# Patient Record
Sex: MALE | Employment: FULL TIME | URBAN - METROPOLITAN AREA
[De-identification: names, ages, dates, MRNs, and addresses within clinical notes are randomized per-mention and may not be internally consistent; named-entity substitution may affect disease eponyms.]

---
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
## 2020-02-03 ENCOUNTER — DOCUMENTATION ONLY (OUTPATIENT)
Dept: CARE COORDINATION | Facility: CLINIC | Age: 20
End: 2020-02-03

## 2020-02-07 ENCOUNTER — ALLIED HEALTH/NURSE VISIT (OUTPATIENT)
Dept: SURGERY | Facility: CLINIC | Age: 20
End: 2020-02-07
Payer: COMMERCIAL

## 2020-02-07 ENCOUNTER — OFFICE VISIT (OUTPATIENT)
Dept: ENDOCRINOLOGY | Facility: CLINIC | Age: 20
End: 2020-02-07
Payer: COMMERCIAL

## 2020-02-07 VITALS
DIASTOLIC BLOOD PRESSURE: 79 MMHG | TEMPERATURE: 98.4 F | HEART RATE: 80 BPM | BODY MASS INDEX: 41.75 KG/M2 | OXYGEN SATURATION: 96 % | HEIGHT: 73 IN | SYSTOLIC BLOOD PRESSURE: 133 MMHG | WEIGHT: 315 LBS

## 2020-02-07 DIAGNOSIS — F90.9 ATTENTION DEFICIT HYPERACTIVITY DISORDER (ADHD), UNSPECIFIED ADHD TYPE: Primary | ICD-10-CM

## 2020-02-07 DIAGNOSIS — E66.01 OBESITY, CLASS III, BMI 40-49.9 (MORBID OBESITY) (H): ICD-10-CM

## 2020-02-07 DIAGNOSIS — R03.0 ELEVATED BLOOD PRESSURE READING WITHOUT DIAGNOSIS OF HYPERTENSION: ICD-10-CM

## 2020-02-07 RX ORDER — DEXTROAMPHETAMINE SACCHARATE, AMPHETAMINE ASPARTATE, DEXTROAMPHETAMINE SULFATE AND AMPHETAMINE SULFATE 2.5; 2.5; 2.5; 2.5 MG/1; MG/1; MG/1; MG/1
10 TABLET ORAL DAILY
COMMUNITY
End: 2020-10-30 | Stop reason: ALTCHOICE

## 2020-02-07 RX ORDER — LISDEXAMFETAMINE DIMESYLATE 50 MG/1
50 CAPSULE ORAL EVERY MORNING
Qty: 14 CAPSULE | Refills: 0 | Status: SHIPPED | OUTPATIENT
Start: 2020-02-21 | End: 2020-04-15 | Stop reason: DRUGHIGH

## 2020-02-07 RX ORDER — DEXTROAMPHETAMINE SACCHARATE, AMPHETAMINE ASPARTATE, DEXTROAMPHETAMINE SULFATE AND AMPHETAMINE SULFATE 5; 5; 5; 5 MG/1; MG/1; MG/1; MG/1
20 TABLET ORAL DAILY
COMMUNITY
End: 2020-10-30 | Stop reason: ALTCHOICE

## 2020-02-07 RX ORDER — LISDEXAMFETAMINE DIMESYLATE 40 MG/1
40 CAPSULE ORAL EVERY MORNING
Qty: 14 CAPSULE | Refills: 0 | Status: SHIPPED | OUTPATIENT
Start: 2020-02-07 | End: 2020-04-15

## 2020-02-07 ASSESSMENT — MIFFLIN-ST. JEOR: SCORE: 2521.73

## 2020-02-07 ASSESSMENT — PAIN SCALES - GENERAL: PAINLEVEL: NO PAIN (0)

## 2020-02-07 NOTE — PROGRESS NOTES
"New Weight Management Nutrition Consultation    [place] White is a 19 year old male presents today for new weight management nutrition consultation.  Patient referred by Dr. Hernandez on February 7, 2020.    Patient with Co-morbidities of obesity including:  none    Anthropometrics:  Estimated body mass index is 41.89 kg/m  as calculated from the following:    Height as of this encounter: 1.86 m (6' 1.23\").    Weight as of this encounter: 144.9 kg (319 lb 8 oz).    Medications for Weight Loss:  None    NUTRITION HISTORY  See MD note for details.    - NKFA  - Trying to decrease dairy product intake  - No supplement (vitamin/minera) intake    Recent food recall:  Breakfast: Diet coke and 2 donut or 12 pc sushi - stopping by grocery store or caffeteria before school  Lunch: 1 [place] (roast beef + pretzel bun, cheese L/T); 1 egg sandwich (2 pc whole grain bread + 2 fried egg)  Dinner: Dinner at home - baked chicken nuggets (12 pc); 1 Gyro with lamb L/O/T on marco  Snacks: If feeling hungry after lunch occ has banana, yogurt of jerky. Tries to only eat when hungry.   Beverages: Diet coke (trying to limit to 1-2 per day), sparkling water. Avoids sugary beverages    Alcohol: None   Dining out: 1-2 times per day  - School cafeteria - foods offered are higher in CHO/fat fods  - Tries to do grocery store more often for healthier options  - Typically eating 2 meals per day (often skipping breakfast). Pt states he does not have hunger cues d/t side effects of Adderall     Physical Activity:  Likes doing projects that are more mentally stimulating. Has a fit bit, avg daily steps = 5000 steps. Open to increasing steps with intentional walks.      MALNUTRITION  % Intake: No decreased intake noted  % Weight Loss: None noted  Subcutaneous Fat Loss: None observed  Muscle Loss: None observed  Fluid Accumulation/Edema: None noted  Malnutrition Diagnosis: Patient does not meet two of the established criteria necessary for diagnosing " "malnutrition    Nutrition Prescription  Recommended energy/nutrient modification.  Volumetrics    Nutrition Diagnosis  Obesity r/t long history of self-monitoring deficit and excessive energy intake aeb BMI >30.    Nutrition Intervention  Materials/education provided on Volumetric eating to help satiety level on fewer calories; portion control and healthy food choices (Plate Method and Volumetrics handouts), 100 calorie snack choices, meal and snack planning and websites, and mindful eating. Discussed importance of meal consistency. Pt states he often does not feel hungry as a side effect of his medication, but will feel irritable or nauseas if he goes long periods without eating. Encouraged pt to set reminder on phone, or schedule meal times. Discussed focusing on adding a serving of fruit or vegetable to each meal, and increasing physical activity by taking 20 min walk after classes. Provided pt with list of goals and RD contact info.      Patient Understanding: good  Expected Compliance: good  Follow-Up Plans: meal planning using Volumetrics     Nutrition Goals  1) Eat 3 meals per day. Use the 9\" Plate method (1/2 plate non-starchy vegetables/fruit, 1/4 plate lean protein, 1/4 plate whole grain starch - no more than 1/2 cup carb/meal)   - Eat slowly (20-30 minutes per meal), chewing foods well (25 chews per bite/applesauce consistency). Stop when you feel full.    - Plan a fruit or vegetable to have with each meal. Tips - keep fresh fruit out on counter, frozen/microwavable vegetables on hand, look up new recipes for ideas.    - Avoid snacking between meals. If hungry, snack on non-starchy vegetables (carrots, broccoli), or small pc of fruit.  2) Start taking a daily multivitamin   3) Consume 64 oz of non-calorie containing fluids daily  4) Limit diet soda to <2 cans per day  5) Take a 20 min walk after classes to help increase daily steps. Aim for 7000 steps per day.     Healthy Recipe Resources  \"The " "Volumetrics Eating Plan\" by Wilma Ag, Ph.D.  www.[employer].com  www.Intellitacticsgirl.com   www.oldApexPeak.org  Dash Diet Recipes [facility]  www.extension.[facility] - the recipe box  \"Cooking that Counts\" by editors of [employer]    Follow-Up:  Monthly    Time spent with patient: 30 minutes.  Sheridan Simon RD, LD        "

## 2020-02-07 NOTE — PROGRESS NOTES
"New Weight Management Nutrition Consultation    [place] White is a 19 year old male presents today for new weight management nutrition consultation.  Patient referred by *** on February 7, 2020.    Patient with Co-morbidities of obesity including:  Type II DM {YESNO:130823}  Renal Failure {YESNO:194389}  Sleep apnea {YESNO:995004}  Hypertension {YESNO:107972}   Dyslipidemia {YESNO:726634}  Joint pain {YESNO:011138}  Back pain {YESNO:570404}  GERD {YESNO:600205}     Anthropometrics:  Estimated body mass index is 41.89 kg/m  as calculated from the following:    Height as of this encounter: 1.86 m (6' 1.23\").    Weight as of this encounter: 144.9 kg (319 lb 8 oz).    Medications for Weight Loss:  *** Phentermine, Topamax, Qsymia, Contrave, Naltrexone, Victoza, Saxenda, Trulicity, Metformin, Wellbutrin    NUTRITION HISTORY  See MD note for details.    - NKFA  - Trying to decrease dairy product intake  - No supplements     Recent food recall:  Breakfast: Diet coke and 2 donut or 12 pc sushi - stopping by grocery store or caffeteria before school  Lunch: 1 [place] (roast beef + pretzel bun, cheese L/T); 1 egg sandwich (2 pc whole grain bread + 2 fried egg)  Dinner: Dinner at home - baked chicken nuggets (12 pc); 1 Gyro with lamb L/O/T on marco  Snacks: If feeling hungry after lunch occ has banana, yogurt of jerky. Tries to only eat when hungry.   Beverages: Diet coke (trying to limit to 1-2 per day), sparkling water. Avoids sugary beverages    Alcohol: None   Dining out: 1-2 times per day  - School cafe - high CHO/fat fods  - Tries to do grocery store more often healthier options)  - Typically eating 2 meals per day (often skipping breakfast). Does not have hunger cues      Physical Activity:  Likes doing projects that are more mentally stimulating. Has a fit bit, avg daily steps = 5000 steps. Open to increasing steps with intentional walks.      MALNUTRITION  % Intake: { :448241}  % Weight Loss: { :628450}  Subcutaneous " "Fat Loss: { :670265}  Muscle Loss: { :478529}  Fluid Accumulation/Edema: { :944281}  Malnutrition Diagnosis: { :803267}    Nutrition Prescription  Recommended energy/nutrient modification.  *** calories/day (per MD)    Nutrition Diagnosis  Food and nutrition related knowledge deficit r/t lack of prior exposure to calorie counting and nutrition education aeb pt unable to verbalize understanding of *** calorie/day diet for weight loss.  ***  Obesity r/t long history of self-monitoring deficit and excessive energy intake aeb BMI >30.    Nutrition Intervention  Materials/education provided on *** calorie/day diet with portion control and healthy food choices (What to Eat handout), 100 calorie snack choices, meal and snack planning and websites, sample meal plans  ***  Materials/education provided on Volumetric eating to help satiety level on fewer calories; portion control and healthy food choices (Plate Method and Volumetrics handouts), 100 calorie snack choices, meal and snack planning and websites, sample meal plans     Patient Understanding: { :742589}  Expected Compliance: { :067327}  Follow-Up Plans: ***     Nutrition Goals  1) Eat 3 meals per day. Use the 9\" Plate method (1/2 plate non-starchy vegetables/fruit, 1/4 plate lean protein, 1/4 plate whole grain starch - no more than 1/2 cup carb/meal)   - Eat slowly (20-30 minutes per meal), chewing foods well (25 chews per bite/applesauce consistency). Stop when you feel full.    - Plan a fruit or vegetable to have with each meal. Tips - keep fresh fruit out on counter, frozen/microwavable vegetables on hand, look up new recipes for ideas.    - Avoid snacking between meals. If hungry, snack on non-starchy vegetables (carrots, broccoli), or small pc of fruit.  2) Start taking a daily multivitamin   3) Consume 64 oz of non-calorie containing fluids daily  4) Limit diet soda to <2 cans per day  5) Take a 20 min walk after classes to help increase daily steps. Aim for " "7000 steps per day.     Healthy Recipe Resources  \"The Volumetrics Eating Plan\" by Wilma Ag, Ph.D.  www.[facility].com  www.Storytime Studiosgirl.aitainment   www.[employer] Me Listed.org  Dash Diet Recipes [facility]  www.extension.[facility] - the recipe box  \"Cooking that Counts\" by editors of [employer]        {UC SURGERY GOALS RELATING BEVERAGE:539096631}  {UC SURGERY GOALS RELATING ACTVITY:632529037}    Follow-Up:  PRN    Time spent with patient: *** minutes.  Sheridan Simon RD, LD        "

## 2020-02-07 NOTE — PATIENT INSTRUCTIONS
"Nutrition Goals  1) Eat 3 meals per day. Use the 9\" Plate method (1/2 plate non-starchy vegetables/fruit, 1/4 plate lean protein, 1/4 plate whole grain starch - no more than 1/2 cup carb/meal)              - Eat slowly (20-30 minutes per meal), chewing foods well (25 chews per bite/applesauce consistency). Stop when you feel full.               - Plan a fruit or vegetable to have with each meal. Tips - keep fresh fruit out on counter, frozen/microwavable vegetables on hand, look up new recipes for ideas.               - Avoid snacking between meals. If hungry, snack on non-starchy vegetables (carrots, broccoli), or small pc of fruit.  2) Start taking a daily multivitamin   3) Consume 64 oz of non-calorie containing fluids daily  4) Limit diet soda to <2 cans per day  5) Take a 20 min walk after classes to help increase daily steps. Aim for 7000 steps per day.      Healthy Recipe Resources  \"The Volumetrics Eating Plan\" by Wilma Ag, Ph.D.  www.[employer]Kit.com  www.hungryorderTalkgirl.4meee   www.oldNimbus LLCpt.org  Dash Diet Recipes [facility]  www.extension.[facility] - the recipe box  \"Cooking that Counts\" by editors of [employer]    Follow-up with RD in one 1 month    Sheridan Simon RD, LD  If you would like to schedule or reschedule an appointment with the RD, please call [phone]    Interested in working with a health ?  Health coaches work with you to improve your overall health and wellbeing.  They look at the whole person, and may involve discussion of different areas of life, including, but not limited to the four pillars of health (sleep, exercise, nutrition, and stress management). Discuss with your care team if you would like to start working a health .    Health Coaching-3 Pack:    $99 for three health coaching visits    Visits may be done in person or via phone    Coaching is a partnership between the  and the client; Coaches do not prescribe or diagnose    Coaching helps inspire the client to " reach his/her personal goals    Healthy Lifestyle Support Group  February: Self Compassion for Weight Loss  Research shows that self-compassion influences our ability to lose weight and maintain our health goals. Learn about the three elements of self-compassion as we explore a variety of self-compassion practices and resources so that you leave feeling empowered and connected to yourself and your health journey.    Date: Wednesday, February 19th, 4:30pm to 5:30pm  Location: [facility]  [address] [place] [address] 1, Rm 1.4    No registration required. For more information, contact your health .  [email or web address]

## 2020-02-07 NOTE — NURSING NOTE
"(   Chief Complaint   Patient presents with     Consult     New weight management.    )    ( Weight: 144.9 kg (319 lb 8 oz) )  ( Height: 186 cm (6' 1.23\") )  ( BMI (Calculated): 41.89 )  (   )  (   )  (   )  (   )  (   )  (   )    ( BP: 133/79 )  (   )  ( Temp: 98.4  F (36.9  C) )  ( Temp src: Oral )  ( Pulse: 80 )  (   )  ( SpO2: 96 % )    ( There is no problem list on file for this patient.   )  (   Current Outpatient Medications   Medication Sig Dispense Refill     amphetamine-dextroamphetamine (ADDERALL) 10 MG tablet Take 10 mg by mouth daily        amphetamine-dextroamphetamine (ADDERALL) 20 MG tablet Take 20 mg by mouth daily      )  ( Diabetes Eval:    )    ( Pain Eval:  No Pain (0) )    ( Wound Eval:       )    (   History   Smoking Status     Never Smoker   Smokeless Tobacco     Never Used    )    ( Signed By:  Naya Dobson, EMT; February 7, 2020; 11:01 AM )    "

## 2020-02-07 NOTE — PATIENT INSTRUCTIONS
"Sleep:  -- consider a blue light filter   -- cover all blue lights if possible   -- consider ear plugs, white noise     Vyvanse: stop adderrall when you start it  - most common side effects: dry mouth, the dry mouth goes away after 2 months (sugar free gum, water)  - OK to take on weekends, but if you take it late in the mornign it may make it hard to fall asleep that night.    Welcome to our weight management program!   We are excited to join you on your weight loss journey    Thank you for allowing us the privilege of caring for you. We hope we provided you with the excellent service you deserve.   Please let us know if there is anything else we can do for you so that we can be sure you are leaving completely satisfied with your care experience.    To ensure the quality of our services you may be receiving a patient satisfaction survey from an independent patient satisfaction monitoring company.    The greatest compliment you can give is a \"Likely to Recommend\"    You saw Dr. Hernandez today.    Instructions per today's visit:   Medications started today:   MT[facility] pharmacist referral:   Sleep referral:   Exercise referral (FOZIA) Get Moving Program:   Health Psychology:   Other referrals ordered today:     Follow up appointments: 1 month         Interested in working with a health ?  Health coaches work with you to improve your overall health and wellbeing.  They look at the whole person, and may involve discussion of different areas of life, including, but not limited to the four pillars of health (sleep, exercise, nutrition, and stress management). Discuss with your care team if you would like to start working a health .  Health Coaching-3 Pack:    $99 for three health coaching visits    Visits may be done in person or via phone    Coaching is a partnership between the  and the client; Coaches do not prescribe or diagnose    Coaching helps inspire the client to reach his/her personal goals     For any " questions/concerns contact Leyda Cross LPN at [phone]     To schedule appointments with our team, please call [phone]     Please call during clinic hours Monday through Friday 8:00a - 4:00p if you have questions or you can contact us via [facility] at anytime. ?    Lab results will be communicated through My Chart or letter (if My Chart not used). Please call the clinic if you have not received communication after 1 week or if you have any questions.?      Fax: [phone]    Thank you,  Medical Weight Management Team

## 2020-02-07 NOTE — LETTER
2020     RE: Ronny Hancock  [address] [facility] [address]     Dear Colleague,    Thank you for referring your patient, Ronny Hancock, to the [facility] at [facility]. Please see a copy of my visit note below.    New Medical Weight Management Consult    PATIENT:  Ronny Hancock  MRN:         [id number]  :         2000  AMBER:         2020    Dear Juanjose Norris,    I had the pleasure of seeing your patient, Ronny Hancock.  Full intake/assessment done to determine barriers to weight loss success and develop a treatment plan.  Ronny Hancock is a 19 year old male interested in treatment of medical problems associated with weight.  His weight today is 319 lbs 8 oz, Body mass index is 41.89 kg/m ., and he has the following co-morbidities:     2020   I have the following health issues associated with obesity: None of the above   I have the following symptoms associated with obesity: None of the above     Goals for coming here: trying to lose weight in general, no specific goal.   Weight history: gained weight in high school, recently stopped gaining. But is not losing.   Past experiences with weight loss: not seriously, walked every day in the summer.   Typical day schedule: gets up at 9:30, at school until 1:50pm. Lunch at school around noon. Home around 2pm. Then video games/TV. Then adderrall wears off (20mg at 9:30pm, ends at 5pm). No nap. Dinner around 5pm-6pm.     Sleep: Goes to bed at inconsistent times, tries to be in bed by midnight, falls sleep at 12:30-1pm, TV in the bedroom? Laptop in bed, watches movie to fall asleep. Does wake up during night: sometimes, because of cat. Can fall asleep right away.  Snores: snore  Typical food: Breakfast: before adderall because he's not hungry after (gets sushi, diet coke, donut). Not eating food at home in morning (because sister and mom are dieting). Snack: no, Lunch: 11:00am,  "Snack: nothing until 5pm, maybe beef jerky; Dinner after 6pm: oven chicken nuggets, eggs, hot dogs, eating after dinner: yes sometimes. Does sometimes eat during the night as well  Periods of hunger during the day: when adderral wears off at around 5pm  Typical snacks: did not ask   Typical beverages: sparkling water, weaning off diet coke, no coffee/tea  Who lives at home: mom, dad, sister  Are they supportive: yes  Who does the shopping: mom/dad, and the cooking: mom/dad.   Mood: good, denied   Mental Health History Reviewed With Patient 2/7/2020   Have you ever been physically or sexually abused? No   If yes, do you feel that the abuse is affecting your weight? N/A   If yes, would you like to talk to a counselor about the abuse? N/A   How often in the past 2 weeks have you felt little interest or pleasure in doing things? More Than Half the Days   Over the past 2 weeks how often have you felt down, depressed, or hopeless? Not at all     Exercise: walking -- tracks steps, often around 5,000 steps     Patient Goals 2/7/2020   I am interested in having a healthier weight to diminish current health problems: Yes   I am interested in having a healthier weight in order to prevent future health problems: Yes   I am interested in having a healthier weight in order to have a future surgery: No     Referring Provider 2/7/2020   Please name the provider who referred you to Medical Weight Management.  If you do not know, please answer: \"I Don't Know\". my mother     Wt Readings from Last 4 Encounters:   02/07/20 144.9 kg (319 lb 8 oz) (>99 %)*     * Growth percentiles are based on CDC (Boys, 2-20 Years) data.     Weight History 2/7/2020   How concerned are you about your weight? Somewhat Concerned   Would you describe your weight gain as gradual? Yes   I became overweight: As a Teenager   The following factors have contributed to my weight gain:  Eating Wrong Types of Food, Eating Too Much, Lack of Exercise, Genetic (Runs in " the Family)   I have tried the following methods to lose weight: Exercise   My lowest weight since age 18 was: 300   My highest weight since age 18 was: 320   The most weight I have ever lost was: (lbs) 0   I have the following family history of obesity/being overweight:  Many of my relatives are overweight   Has anyone in your family had weight loss surgery? Yes   How has your weight changed over the last year?  Gained   How many pounds? 3       Diet Recall 2/7/2020   Glass juice/day 0   Glass milk/day 0   Glass sugary drink/day 0   How many cans/bottles of sugar pop/soda/tea/sports drinks do you drink in a day? 3   Diet drink/day 3   Alcohol Never       Eating Habits 2/7/2020   Generally, my meals include foods like these: bread, pasta, rice, potatoes, corn, crackers, sweet dessert, pop, or juice. Almost Everyday   Generally, my meals include foods like these: fried meats, brats, burgers, french fries, pizza, cheese, chips, or ice cream. Almost Everyday   Eat fast food (like [employer], [employer], [employer]aco [employer]). A Few Times a Week   Eat at a buffet or sit-down restaurant. Once a Week   Eat most of my meals in front of the TV or computer. Everyday   Often skip meals, eat at random times, have no regular eating times. Everyday   Rarely sit down for a meal but snack or graze throughout.  A Few Times a Week   Eat extra snacks between meals. A Few Times a Week   Eat most of my food at the end of the day. Almost Everyday   Eat in the middle of the night or wake up at night to eat. Almost Everyday   Eat extra snacks to prevent or correct low blood sugar. A Few Times a Week   Eat to prevent acid reflux or stomach pain. Never   Worry about not having enough food to eat. Never   Have you been to the food shelf at least a few times this year? No   I eat when I am depressed. Never   I eat when I am stressed. Never   I eat when I am bored. Once a Week   I eat when I am anxious. Never   I eat when I am happy or as a reward. Never    I feel hungry all the time even if I just have eaten. A Few Times a Week   Feeling full is important to me. Never   I finish all the food on my plate even if I am already full. Never   I can't resist eating delicious food or walk past the good food/smell. Almost Everyday   I eat/snack without noticing that I am eating. Never   I eat when I am preparing the meal. A Few Times a Week   I eat more than usual when I see others eating. Never   I have trouble not eating sweets, ice cream, cookies, or chips if they are around the house. A Few Times a Week   I think about food all day. Never   What foods, if any, do you crave? None     Activity/Exercise History 2/7/2020   How much of a typical 12 hour day do you spend sitting? Most of the Day   How much of a typical 12 hour day do you spend lying down? Less Than Half the Day   How much of a typical day do you spend walking/standing? Less Than Half the Day   How many hours (not including work) do you spend on the TV/Video Games/Computer/Tablet/Phone? 6 Hours or More   How many times a week are you active for the purpose of exercise? Once a Week   What keeps you from being more active? Lack of Time       PAST MEDICAL HISTORY:  No flowsheet data found.  No past medical history on file.    Work/Social History Reviewed With Patient 2/7/2020   My employment status is: Student   How many hours do you spend commuting to work daily?  1   What is your marital status? Single   If in a relationship, is your significant other overweight? N/A   Do you have children? No   If you have children, are they overweight? N/A   Who do you live with?  family   Are they supportive of your health goals? Yes   Who does the food shopping?  parents     No flowsheet data found.   SUrgical History:   Appendicitis, tonsils removed   Family History: both parents have BLANCA; sister, father, mother have obesity     MEDICATIONS:   Current Outpatient Medications   Medication Sig Dispense Refill      "amphetamine-dextroamphetamine (ADDERALL) 10 MG tablet Take 10 mg by mouth daily        amphetamine-dextroamphetamine (ADDERALL) 20 MG tablet Take 20 mg by mouth daily       lisdexamfetamine (VYVANSE) 40 MG capsule Take 1 capsule (40 mg) by mouth every morning Take it first thing when you wake up, don't take with food. 14 capsule 0     lisdexamfetamine (VYVANSE) 50 MG capsule Take 1 capsule (50 mg) by mouth every morning Take right when you wake up in the morning, without food. 14 capsule 0       ALLERGIES:   No Known Allergies    PHYSICAL EXAM:  /79 (BP Location: Left arm, Patient Position: Sitting, Cuff Size: Adult Large)   Pulse 80   Temp 98.4  F (36.9  C) (Oral)   Ht 1.86 m (6' 1.23\")   Wt 144.9 kg (319 lb 8 oz)   SpO2 96%   BMI 41.89 kg/m      A & O x 3  HEENT: NCAT, mucous membranes moist  Respirations unlabored  Location of obesity: Mixed Obesity   Modified Mallampati: 3    LABS: he wants to get labs at his next visit   No results found for: A1C  No results found for: CHOL  No results found for: TSH  No results found for: CR  No results found for: ALT    ASSESSMENT:  Mr. Hancock is a 19 year old male with history of The primary encounter diagnosis was Attention deficit hyperactivity disorder (ADHD), unspecified ADHD type. Diagnoses of Elevated blood pressure reading without diagnosis of hypertension and Obesity, Class III, BMI 40-49.9 (morbid obesity) (H) were also pertinent to this visit. and Class III obesity with current body mass index is 41.89 kg/m . and with current health consequences who presents for an initial weight management consultation. Ronny Hancock overall has a fairly sedentary lifestyle with a lot of screen time.    PLAN:    Attention deficit hyperactivity disorder (ADHD), unspecified ADHD type  He notes significant change in appetite after adderrall wears off, so we will switch to a long-acting stimulant medication for weight loss. Lisdexamfetamine has benefits for binge-eating as " "well.   - lisdexamfetamine (VYVANSE) 40 MG capsule  Dispense: 14 capsule; Refill: 0  - lisdexamfetamine (VYVANSE) 50 MG capsule  Dispense: 14 capsule; Refill: 0    Elevated blood pressure reading without diagnosis of hypertension  Will continue to monitor, RTC in 4 weeks for follow-up    Obesity, Class III, BMI 40-49.9 (morbid obesity) (H)  Will start with a goal of losing 10% of body weight. Will start with change to lisdexamfetamine, will continue other pharmacotherapy in the future. Will get labs if willing, at next visit.     With motivational interviewing, [place] took part in making the following plan for their Class III Obesity:   Patient Instructions   Sleep:  -- consider a blue light filter   -- cover all blue lights if possible   -- consider ear plugs, white noise     Vyvanse: stop adderrall when you start it  - most common side effects: dry mouth, the dry mouth goes away after 2 months (sugar free gum, water)  - OK to take on weekends, but if you take it late in the mornign it may make it hard to fall asleep that night.    Welcome to our weight management program!   We are excited to join you on your weight loss journey    Thank you for allowing us the privilege of caring for you. We hope we provided you with the excellent service you deserve.   Please let us know if there is anything else we can do for you so that we can be sure you are leaving completely satisfied with your care experience.    To ensure the quality of our services you may be receiving a patient satisfaction survey from an independent patient satisfaction monitoring company.    The greatest compliment you can give is a \"Likely to Recommend\"    You saw Dr. Hernandez today.    Instructions per today's visit:   Medications started today:   MTM pharmacist referral:   Sleep referral:   Exercise referral (FOZIA) Get Moving Program:   Health Psychology:   Other referrals ordered today:     Follow up appointments: 1 month         Interested in " working with a health ?  Health coaches work with you to improve your overall health and wellbeing.  They look at the whole person, and may involve discussion of different areas of life, including, but not limited to the four pillars of health (sleep, exercise, nutrition, and stress management). Discuss with your care team if you would like to start working a health .  Health Coaching-3 Pack:    $99 for three health coaching visits    Visits may be done in person or via phone    Coaching is a partnership between the  and the client; Coaches do not prescribe or diagnose    Coaching helps inspire the client to reach his/her personal goals     For any questions/concerns contact Leyda Cross LPN at [phone]     To schedule appointments with our team, please call [phone]     Please call during clinic hours Monday through Friday 8:00a - 4:00p if you have questions or you can contact us via [employer] at anytime. ?    Lab results will be communicated through My Chart or letter (if My Chart not used). Please call the clinic if you have not received communication after 1 week or if you have any questions.?      Fax: [phone]    Thank you,  Medical Weight Management Team          RTC:    4 weeks.    TIME: 40 min spent on evaluation, management, counseling, education, & motivational interviewing with greater than 50 % of the total time was spent on counseling and coordinating care    Sincerely,    Jacqueline Hernandez MD  Diplomat, [employer] Board of Obesity Medicine

## 2020-02-07 NOTE — PROGRESS NOTES
New Medical Weight Management Consult    PATIENT:  Ronny Hancock  MRN:         [id number]  :         2000  AMBER:         2020    Dear Juanjose Norris,    I had the pleasure of seeing your patient, Ronny Hancock.  Full intake/assessment done to determine barriers to weight loss success and develop a treatment plan.  Ronny Hancock is a 19 year old male interested in treatment of medical problems associated with weight.  His weight today is 319 lbs 8 oz, Body mass index is 41.89 kg/m ., and he has the following co-morbidities:     2020   I have the following health issues associated with obesity: None of the above   I have the following symptoms associated with obesity: None of the above     Goals for coming here: trying to lose weight in general, no specific goal.   Weight history: gained weight in high school, recently stopped gaining. But is not losing.   Past experiences with weight loss: not seriously, walked every day in the summer.   Typical day schedule: gets up at 9:30, at school until 1:50pm. Lunch at school around noon. Home around 2pm. Then video games/TV. Then adderrall wears off (20mg at 9:30pm, ends at 5pm). No nap. Dinner around 5pm-6pm.     Sleep: Goes to bed at inconsistent times, tries to be in bed by midnight, falls sleep at 12:30-1pm, TV in the bedroom? Laptop in bed, watches movie to fall asleep. Does wake up during night: sometimes, because of cat. Can fall asleep right away.  Snores: snore  Typical food: Breakfast: before adderall because he's not hungry after (gets sushi, diet coke, donut). Not eating food at home in morning (because sister and mom are dieting). Snack: no, Lunch: 11:00am, Snack: nothing until 5pm, maybe beef jerky; Dinner after 6pm: oven chicken nuggets, eggs, hot dogs, eating after dinner: yes sometimes. Does sometimes eat during the night as well  Periods of hunger during the day: when adderral wears off at around 5pm  Typical snacks: did not ask  "  Typical beverages: sparkling water, weaning off diet coke, no coffee/tea  Who lives at home: mom, dad, sister  Are they supportive: yes  Who does the shopping: mom/dad, and the cooking: mom/dad.   Mood: good, denied   Mental Health History Reviewed With Patient 2/7/2020   Have you ever been physically or sexually abused? No   If yes, do you feel that the abuse is affecting your weight? N/A   If yes, would you like to talk to a counselor about the abuse? N/A   How often in the past 2 weeks have you felt little interest or pleasure in doing things? More Than Half the Days   Over the past 2 weeks how often have you felt down, depressed, or hopeless? Not at all     Exercise: walking -- tracks steps, often around 5,000 steps     Patient Goals 2/7/2020   I am interested in having a healthier weight to diminish current health problems: Yes   I am interested in having a healthier weight in order to prevent future health problems: Yes   I am interested in having a healthier weight in order to have a future surgery: No     Referring Provider 2/7/2020   Please name the provider who referred you to Medical Weight Management.  If you do not know, please answer: \"I Don't Know\". my mother     Wt Readings from Last 4 Encounters:   02/07/20 144.9 kg (319 lb 8 oz) (>99 %)*     * Growth percentiles are based on CDC (Boys, 2-20 Years) data.     Weight History 2/7/2020   How concerned are you about your weight? Somewhat Concerned   Would you describe your weight gain as gradual? Yes   I became overweight: As a Teenager   The following factors have contributed to my weight gain:  Eating Wrong Types of Food, Eating Too Much, Lack of Exercise, Genetic (Runs in the Family)   I have tried the following methods to lose weight: Exercise   My lowest weight since age 18 was: 300   My highest weight since age 18 was: 320   The most weight I have ever lost was: (lbs) 0   I have the following family history of obesity/being overweight:  Many of " my relatives are overweight   Has anyone in your family had weight loss surgery? Yes   How has your weight changed over the last year?  Gained   How many pounds? 3       Diet Recall 2/7/2020   Glass juice/day 0   Glass milk/day 0   Glass sugary drink/day 0   How many cans/bottles of sugar pop/soda/tea/sports drinks do you drink in a day? 3   Diet drink/day 3   Alcohol Never       Eating Habits 2/7/2020   Generally, my meals include foods like these: bread, pasta, rice, potatoes, corn, crackers, sweet dessert, pop, or juice. Almost Everyday   Generally, my meals include foods like these: fried meats, brats, burgers, french fries, pizza, cheese, chips, or ice cream. Almost Everyday   Eat fast food (like McDonalds, Koala[employer], Taco Bell). A Few Times a Week   Eat at a buffet or sit-down restaurant. Once a Week   Eat most of my meals in front of the TV or computer. Everyday   Often skip meals, eat at random times, have no regular eating times. Everyday   Rarely sit down for a meal but snack or graze throughout.  A Few Times a Week   Eat extra snacks between meals. A Few Times a Week   Eat most of my food at the end of the day. Almost Everyday   Eat in the middle of the night or wake up at night to eat. Almost Everyday   Eat extra snacks to prevent or correct low blood sugar. A Few Times a Week   Eat to prevent acid reflux or stomach pain. Never   Worry about not having enough food to eat. Never   Have you been to the food shelf at least a few times this year? No   I eat when I am depressed. Never   I eat when I am stressed. Never   I eat when I am bored. Once a Week   I eat when I am anxious. Never   I eat when I am happy or as a reward. Never   I feel hungry all the time even if I just have eaten. A Few Times a Week   Feeling full is important to me. Never   I finish all the food on my plate even if I am already full. Never   I can't resist eating delicious food or walk past the good food/smell. Almost Everyday   I  eat/snack without noticing that I am eating. Never   I eat when I am preparing the meal. A Few Times a Week   I eat more than usual when I see others eating. Never   I have trouble not eating sweets, ice cream, cookies, or chips if they are around the house. A Few Times a Week   I think about food all day. Never   What foods, if any, do you crave? None     Activity/Exercise History 2/7/2020   How much of a typical 12 hour day do you spend sitting? Most of the Day   How much of a typical 12 hour day do you spend lying down? Less Than Half the Day   How much of a typical day do you spend walking/standing? Less Than Half the Day   How many hours (not including work) do you spend on the TV/Video Games/Computer/Tablet/Phone? 6 Hours or More   How many times a week are you active for the purpose of exercise? Once a Week   What keeps you from being more active? Lack of Time       PAST MEDICAL HISTORY:  No flowsheet data found.  No past medical history on file.    Work/Social History Reviewed With Patient 2/7/2020   My employment status is: Student   How many hours do you spend commuting to work daily?  1   What is your marital status? Single   If in a relationship, is your significant other overweight? N/A   Do you have children? No   If you have children, are they overweight? N/A   Who do you live with?  family   Are they supportive of your health goals? Yes   Who does the food shopping?  parents     No flowsheet data found.   SUrgical History:   Appendicitis, tonsils removed   Family History: both parents have BLANCA; sister, father, mother have obesity     MEDICATIONS:   Current Outpatient Medications   Medication Sig Dispense Refill     amphetamine-dextroamphetamine (ADDERALL) 10 MG tablet Take 10 mg by mouth daily        amphetamine-dextroamphetamine (ADDERALL) 20 MG tablet Take 20 mg by mouth daily       lisdexamfetamine (VYVANSE) 40 MG capsule Take 1 capsule (40 mg) by mouth every morning Take it first thing when you  "wake up, don't take with food. 14 capsule 0     lisdexamfetamine (VYVANSE) 50 MG capsule Take 1 capsule (50 mg) by mouth every morning Take right when you wake up in the morning, without food. 14 capsule 0       ALLERGIES:   No Known Allergies    PHYSICAL EXAM:  /79 (BP Location: Left arm, Patient Position: Sitting, Cuff Size: Adult Large)   Pulse 80   Temp 98.4  F (36.9  C) (Oral)   Ht 1.86 m (6' 1.23\")   Wt 144.9 kg (319 lb 8 oz)   SpO2 96%   BMI 41.89 kg/m     A & O x 3  HEENT: NCAT, mucous membranes moist  Respirations unlabored  Location of obesity: Mixed Obesity   Modified Mallampati: 3    LABS: he wants to get labs at his next visit   No results found for: A1C  No results found for: CHOL  No results found for: TSH  No results found for: CR  No results found for: ALT    ASSESSMENT:  Mr. Hancock is a 19 year old male with history of The primary encounter diagnosis was Attention deficit hyperactivity disorder (ADHD), unspecified ADHD type. Diagnoses of Elevated blood pressure reading without diagnosis of hypertension and Obesity, Class III, BMI 40-49.9 (morbid obesity) (H) were also pertinent to this visit. and Class III obesity with current body mass index is 41.89 kg/m . and with current health consequences who presents for an initial weight management consultation. Ronny Hancock overall has a fairly sedentary lifestyle with a lot of screen time.    PLAN:    Attention deficit hyperactivity disorder (ADHD), unspecified ADHD type  He notes significant change in appetite after adderrall wears off, so we will switch to a long-acting stimulant medication for weight loss. Lisdexamfetamine has benefits for binge-eating as well.   - lisdexamfetamine (VYVANSE) 40 MG capsule  Dispense: 14 capsule; Refill: 0  - lisdexamfetamine (VYVANSE) 50 MG capsule  Dispense: 14 capsule; Refill: 0    Elevated blood pressure reading without diagnosis of hypertension  Will continue to monitor, RTC in 4 weeks for " "follow-up    Obesity, Class III, BMI 40-49.9 (morbid obesity) (H)  Will start with a goal of losing 10% of body weight. Will start with change to lisdexamfetamine, will continue other pharmacotherapy in the future. Will get labs if willing, at next visit.     With motivational interviewing, Ronny took part in making the following plan for their Class III Obesity:   Patient Instructions   Sleep:  -- consider a blue light filter   -- cover all blue lights if possible   -- consider ear plugs, white noise     Vyvanse: stop adderrall when you start it  - most common side effects: dry mouth, the dry mouth goes away after 2 months (sugar free gum, water)  - OK to take on weekends, but if you take it late in the mornign it may make it hard to fall asleep that night.    Welcome to our weight management program!   We are excited to join you on your weight loss journey    Thank you for allowing us the privilege of caring for you. We hope we provided you with the excellent service you deserve.   Please let us know if there is anything else we can do for you so that we can be sure you are leaving completely satisfied with your care experience.    To ensure the quality of our services you may be receiving a patient satisfaction survey from an independent patient satisfaction monitoring company.    The greatest compliment you can give is a \"Likely to Recommend\"    You saw Dr. Hernandez today.    Instructions per today's visit:   Medications started today:   MT[facility] pharmacist referral:   Sleep referral:   Exercise referral (FOZIA) Get Moving Program:   Health Psychology:   Other referrals ordered today:     Follow up appointments: 1 month         Interested in working with a health ?  Health coaches work with you to improve your overall health and wellbeing.  They look at the whole person, and may involve discussion of different areas of life, including, but not limited to the four pillars of health (sleep, exercise, nutrition, " and stress management). Discuss with your care team if you would like to start working a health .  Health Coaching-3 Pack:    $99 for three health coaching visits    Visits may be done in person or via phone    Coaching is a partnership between the  and the client; Coaches do not prescribe or diagnose    Coaching helps inspire the client to reach his/her personal goals     For any questions/concerns contact Leyda Cross LPN at [phone]     To schedule appointments with our team, please call [phone]     Please call during clinic hours Monday through Friday 8:00a - 4:00p if you have questions or you can contact us via [employer] at anytime. ?    Lab results will be communicated through My Chart or letter (if My Chart not used). Please call the clinic if you have not received communication after 1 week or if you have any questions.?      Fax: [phone]    Thank you,  Medical Weight Management Team          RTC:    4 weeks.    TIME: 40 min spent on evaluation, management, counseling, education, & motivational interviewing with greater than 50 % of the total time was spent on counseling and coordinating care    Sincerely,    Jacqueline Hernandez MD  Diplomat, American Board of Obesity Medicine

## 2020-03-11 ENCOUNTER — HEALTH MAINTENANCE LETTER (OUTPATIENT)
Age: 20
End: 2020-03-11

## 2020-04-15 DIAGNOSIS — F90.9 ATTENTION DEFICIT HYPERACTIVITY DISORDER (ADHD), UNSPECIFIED ADHD TYPE: ICD-10-CM

## 2020-04-15 RX ORDER — LISDEXAMFETAMINE DIMESYLATE 20 MG/1
20 CAPSULE ORAL EVERY MORNING
Qty: 30 CAPSULE | Refills: 0 | Status: SHIPPED | OUTPATIENT
Start: 2020-04-15 | End: 2020-10-30

## 2020-10-05 ENCOUNTER — MYC REFILL (OUTPATIENT)
Dept: PEDIATRICS | Facility: CLINIC | Age: 20
End: 2020-10-05

## 2020-10-05 DIAGNOSIS — F90.9 ATTENTION DEFICIT HYPERACTIVITY DISORDER (ADHD), UNSPECIFIED ADHD TYPE: ICD-10-CM

## 2020-10-05 RX ORDER — LISDEXAMFETAMINE DIMESYLATE 20 MG/1
20 CAPSULE ORAL EVERY MORNING
Qty: 30 CAPSULE | Refills: 0 | Status: CANCELLED | OUTPATIENT
Start: 2020-10-05

## 2020-10-05 NOTE — TELEPHONE ENCOUNTER
Refill denied. Patient needs appointment. [employer] message sent to patient to schedule appointment with Dr. Hernandez.

## 2020-10-30 ENCOUNTER — VIRTUAL VISIT (OUTPATIENT)
Dept: ENDOCRINOLOGY | Facility: CLINIC | Age: 20
End: 2020-10-30
Payer: COMMERCIAL

## 2020-10-30 VITALS — BODY MASS INDEX: 38.89 KG/M2 | HEIGHT: 76 IN

## 2020-10-30 DIAGNOSIS — F90.9 ATTENTION DEFICIT HYPERACTIVITY DISORDER (ADHD), UNSPECIFIED ADHD TYPE: ICD-10-CM

## 2020-10-30 DIAGNOSIS — E66.01 OBESITY, CLASS III, BMI 40-49.9 (MORBID OBESITY) (H): Primary | ICD-10-CM

## 2020-10-30 PROCEDURE — 99214 OFFICE O/P EST MOD 30 MIN: CPT | Mod: 95 | Performed by: INTERNAL MEDICINE

## 2020-10-30 RX ORDER — LISDEXAMFETAMINE DIMESYLATE 20 MG/1
20 CAPSULE ORAL EVERY MORNING
Qty: 30 CAPSULE | Refills: 0 | Status: SHIPPED | OUTPATIENT
Start: 2020-10-30 | End: 2020-12-12

## 2020-10-30 RX ORDER — METFORMIN HCL 500 MG
TABLET, EXTENDED RELEASE 24 HR ORAL
Qty: 120 TABLET | Refills: 1 | Status: SHIPPED | OUTPATIENT
Start: 2020-10-30 | End: 2021-01-29

## 2020-10-30 ASSESSMENT — PAIN SCALES - GENERAL: PAINLEVEL: NO PAIN (0)

## 2020-10-30 NOTE — PROGRESS NOTES
"Return Medical Weight Management Note  Ronny Hancock  MRN:  [id number]  :  2000  AMBER:  10/30/2020    Dear Juanjose Norris (Inactive),    I had the pleasure of seeing your patient Ronny Hancock for follow-up consultation.  He is a 19 year old male who I am continuing to see for treatment of obesity related to:       2020   I have the following health issues associated with obesity: None of the above   I have the following symptoms associated with obesity: None of the above     INTERVAL HISTORY:  [place] vyvanse helped with appetite and not overeating.   Is not sure if his weight is up or down, has not been weighed since his last visit.     CURRENT WEIGHT:   0 lbs 0 oz    Wt Readings from Last 4 Encounters:   20 144.9 kg (319 lb 8 oz) (>99 %, Z= 3.25)*     * Growth percentiles are based on [facility] (Boys, 2-20 Years) data.     Height:  6' 4\"  Body Mass Index:  Body mass index is 38.89 kg/m .  Vitals:  B/P: Data Unavailable, P: Data Unavailable, R: Data Unavailable     Starting weight in weight management with Dr. Hernandez was: 319    Diet Recall Review with Patient 2020   Do you typically eat breakfast? No   Do you typically eat lunch? Yes   Do you typically eat supper? Yes   Do you typically eat snacks? Yes   Do you like vegetables?  Yes   Do you drink water? Yes   How many glasses of juice do you drink in a typical day? 0   How many of glasses of milk do you drink in a typical day? 0   If you do drink milk, what type? N/A   How many 8oz glasses of sugar containing drinks such as Aguilar-Aid/sweet tea do you drink in a day? 0   How many cans/bottles of sugar pop/soda/tea/sports drinks do you drink in a day? 3   How many cans/bottles of diet pop/soda/tea or sports drink do you drink in a day? 3   How often do you have a drink of alcohol? Never       MEDICATIONS:   Current Outpatient Medications   Medication     lisdexamfetamine (VYVANSE) 20 MG capsule     metFORMIN (GLUCOPHAGE-XR) 500 MG 24 hr tablet " "    No current facility-administered medications for this visit.        Weight Loss Medication History Reviewed With Patient 10/29/2020   Which weight loss medications are you currently taking on a regular basis?  None   If you are not taking a weight loss medication that was prescribed to you, please indicate why: Other   Are you having any side effects from the weight loss medication that we have prescribed you? No       LABS:  No results found for: A1C  No results found for: CHOL  No results found for: TSH  No results found for: CR  No results found for: ALT    ASSESSMENT:  Mr. Hancock is a 19 year old male with history of Class2  obesity with current body mass index is 38.89 kg/m . and with current health consequences who presents for weight management follow-up consultation.     The following diagnoses are relevant to Ronny Hancock's history of obesity:     PLAN:    Problem   Obesity, Class III, Bmi 40-49.9 (Morbid Obesity) (H)    Oct 2020: Restarting vyvanse, as this had helped with decreased binge eating as well as ADHD. Starting metformin for help with weight, drawing labs. Is walking regularly (2-3 days/week).   - starting metformin  - restarting vyvanse   - continuing walking, starting weighing        No problem-specific Assessment & Plan notes found for this encounter.      Orders Placed This Encounter   Procedures     CBC with platelets        With motivational interviewing, [place] took part in making the following plan:  Patient Instructions   Metformin:  - Week 1: take 1 tablet by mouth per day  - Week 2: take 2 tablets by mouth daily   - Week 3: take 3 tablets by mouth daily  - Week 4: take 4 tablets by mouth daily     Metformin: Is a very safe medication and most patients do not get side effects. It is considered as \"off label\" use for weight loss.   - It can be taken any time of the day.   - Metformin can decrease appetite, so the least helpful time of the day to take it would be right before bed. " "We try to take it in the morning and afternoon or early evening if possible.   - You can take it with or without food  - If you get side effects (diarrhea or stomach upset), try taking it at the end of a meal.  If you don't get side effects, you can take it anytime you want.     Restart Vyvanse  I will send lab orders in the computer, so you can schedule a time to get labs drawn at any [facility] close to you.   Keep up the great walking and sleep duration!  Consider weighing once/week       FOLLOW-UP:    4 weeks.    Time: ** min spent on evaluation, management, counseling, education, & motivational interviewing with greater than 50 % of the total time was spent on counseling and coordinating care    Thank you for including me in the care of your patient.  Please do not hesitate to call with questions or concerns.    Sincerely,    Jacqueline Hernandez MD MPH  Diplomate, American Board of Obesity Medicine, American Board of Internal Medicine, American Board of Pediatrics    Departments of Internal Medicine and Pediatrics  [facility]        [unfilled]     [place] White is a 19 year old male who is being evaluated via a billable video visit.      The patient has been notified of following:     \"This video visit will be conducted via a call between you and your physician/provider. We have found that certain health care needs can be provided without the need for an in-person physical exam.  This service lets us provide the care you need with a video conversation.  If a prescription is necessary we can send it directly to your pharmacy.  If lab work is needed we can place an order for that and you can then stop by our lab to have the test done at a later time.    Video visits are billed at different rates depending on your insurance coverage.  Please reach out to your insurance provider with any questions.    If during the course of the call the physician/provider feels a video " "visit is not appropriate, you will not be charged for this service.\"    Patient has given verbal consent for Video visit? Yes  How would you like to obtain your AVS? MyChart  If you are dropped from the video visit, the video invite should be resent to: Text to cell phone: [phone]  Will anyone else be joining your video visit? No      During this virtual visit the patient is located in [place], patient verifies this as the location during the entirety of this visit.   Video-Visit Details    Type of service:  Video Visit    Video Start Time: 224  Video End Time: 2:47 PM    Originating Location (pt. Location): Home    Distant Location (provider location):  [facility] [place]     Platform used for Video Visit: Lisa Hernandez MD        "

## 2020-10-30 NOTE — LETTER
"10/30/2020       RE: Ronny Hancock  [address] [facility] [address]     Dear Colleague,    Thank you for referring your patient, Ronny Hancock, to the [facility] [place] at [facility]. Please see a copy of my visit note below.    Return Medical Weight Management Note  Ronny Hancock  MRN:  [id number]  :  2000  AMBER:  10/30/2020    Dear Juanjose Norris (Inactive),    I had the pleasure of seeing your patient Ronny Hancock for follow-up consultation.  He is a 19 year old male who I am continuing to see for treatment of obesity related to:       2020   I have the following health issues associated with obesity: None of the above   I have the following symptoms associated with obesity: None of the above     INTERVAL HISTORY:  [place] vyvanse helped with appetite and not overeating.   Is not sure if his weight is up or down, has not been weighed since his last visit.     CURRENT WEIGHT:   0 lbs 0 oz    Wt Readings from Last 4 Encounters:   20 144.9 kg (319 lb 8 oz) (>99 %, Z= 3.25)*     * Growth percentiles are based on CDC (Boys, 2-20 Years) data.     Height:  6' 4\"  Body Mass Index:  Body mass index is 38.89 kg/m .  Vitals:  B/P: Data Unavailable, P: Data Unavailable, R: Data Unavailable     Starting weight in weight management with Dr. Hernandez was: 319    Diet Recall Review with Patient 2020   Do you typically eat breakfast? No   Do you typically eat lunch? Yes   Do you typically eat supper? Yes   Do you typically eat snacks? Yes   Do you like vegetables?  Yes   Do you drink water? Yes   How many glasses of juice do you drink in a typical day? 0   How many of glasses of milk do you drink in a typical day? 0   If you do drink milk, what type? N/A   How many 8oz glasses of sugar containing drinks such as Aguilar-Aid/sweet tea do you drink in a day? 0   How many cans/bottles of sugar pop/soda/tea/sports drinks do you drink " in a day? 3   How many cans/bottles of diet pop/soda/tea or sports drink do you drink in a day? 3   How often do you have a drink of alcohol? Never       MEDICATIONS:   Current Outpatient Medications   Medication     lisdexamfetamine (VYVANSE) 20 MG capsule     metFORMIN (GLUCOPHAGE-XR) 500 MG 24 hr tablet     No current facility-administered medications for this visit.        Weight Loss Medication History Reviewed With Patient 10/29/2020   Which weight loss medications are you currently taking on a regular basis?  None   If you are not taking a weight loss medication that was prescribed to you, please indicate why: Other   Are you having any side effects from the weight loss medication that we have prescribed you? No       LABS:  No results found for: A1C  No results found for: CHOL  No results found for: TSH  No results found for: CR  No results found for: ALT    ASSESSMENT:  Mr. Hancock is a 19 year old male with history of Class2  obesity with current body mass index is 38.89 kg/m . and with current health consequences who presents for weight management follow-up consultation.     The following diagnoses are relevant to Ronny Hancock's history of obesity:     PLAN:    Problem   Obesity, Class III, Bmi 40-49.9 (Morbid Obesity) (H)    Oct 2020: Restarting vyvanse, as this had helped with decreased binge eating as well as ADHD. Starting metformin for help with weight, drawing labs. Is walking regularly (2-3 days/week).   - starting metformin  - restarting vyvanse   - continuing walking, starting weighing        No problem-specific Assessment & Plan notes found for this encounter.      Orders Placed This Encounter   Procedures     CBC with platelets        With motivational interviewing, [place] took part in making the following plan:  Patient Instructions   Metformin:  - Week 1: take 1 tablet by mouth per day  - Week 2: take 2 tablets by mouth daily   - Week 3: take 3 tablets by mouth daily  - Week 4: take 4  "tablets by mouth daily     Metformin: Is a very safe medication and most patients do not get side effects. It is considered as \"off label\" use for weight loss.   - It can be taken any time of the day.   - Metformin can decrease appetite, so the least helpful time of the day to take it would be right before bed. We try to take it in the morning and afternoon or early evening if possible.   - You can take it with or without food  - If you get side effects (diarrhea or stomach upset), try taking it at the end of a meal.  If you don't get side effects, you can take it anytime you want.     Restart Vyvanse  I will send lab orders in the computer, so you can schedule a time to get labs drawn at any [place] Location close to you.   Keep up the great walking and sleep duration!  Consider weighing once/week       FOLLOW-UP:    4 weeks.    Time: ** min spent on evaluation, management, counseling, education, & motivational interviewing with greater than 50 % of the total time was spent on counseling and coordinating care    Thank you for including me in the care of your patient.  Please do not hesitate to call with questions or concerns.    Sincerely,    Jacqueline Hernandez MD MPH  Diplomate, American Board of Obesity Medicine, American Board of Internal Medicine, American Board of Pediatrics    Departments of Internal Medicine and Pediatrics  [facility]        [unfilled]     [place] White is a 19 year old male who is being evaluated via a billable video visit.      The patient has been notified of following:     \"This video visit will be conducted via a call between you and your physician/provider. We have found that certain health care needs can be provided without the need for an in-person physical exam.  This service lets us provide the care you need with a video conversation.  If a prescription is necessary we can send it directly to your pharmacy.  If lab work is needed we can place an " "order for that and you can then stop by our lab to have the test done at a later time.    Video visits are billed at different rates depending on your insurance coverage.  Please reach out to your insurance provider with any questions.    If during the course of the call the physician/provider feels a video visit is not appropriate, you will not be charged for this service.\"    Patient has given verbal consent for Video visit? Yes  How would you like to obtain your AVS? MyChart  If you are dropped from the video visit, the video invite should be resent to: Text to cell phone: [phone]  Will anyone else be joining your video visit? No      During this virtual visit the patient is located in [place], patient verifies this as the location during the entirety of this visit.   Video-Visit Details    Type of service:  Video Visit    Video Start Time: 224  Video End Time: 2:47 PM    Originating Location (pt. Location): Home    Distant Location (provider location):  [facility] WE[facility]NAGEMENT [facility] [place]     Platform used for Video Visit: Lisa Hernandez MD          "

## 2020-10-30 NOTE — NURSING NOTE
"Chief Complaint   Patient presents with     RECHECK     Follow up appointment.       Vitals:    10/30/20 1342   Height: 1.93 m (6' 4\")       Body mass index is 38.89 kg/m .                            DERRELL FRANCO, EMT    "

## 2020-10-30 NOTE — PATIENT INSTRUCTIONS
"Metformin:  - Week 1: take 1 tablet by mouth per day  - Week 2: take 2 tablets by mouth daily   - Week 3: take 3 tablets by mouth daily  - Week 4: take 4 tablets by mouth daily     Metformin: Is a very safe medication and most patients do not get side effects. It is considered as \"off label\" use for weight loss.   - It can be taken any time of the day.   - Metformin can decrease appetite, so the least helpful time of the day to take it would be right before bed. We try to take it in the morning and afternoon or early evening if possible.   - You can take it with or without food  - If you get side effects (diarrhea or stomach upset), try taking it at the end of a meal.  If you don't get side effects, you can take it anytime you want.     Restart Vyvanse  I will send lab orders in the computer, so you can schedule a time to get labs drawn at any [place] Location close to you.   Keep up the great walking and sleep duration!  Consider weighing once/week   "

## 2020-11-02 ENCOUNTER — TELEPHONE (OUTPATIENT)
Dept: ENDOCRINOLOGY | Facility: CLINIC | Age: 20
End: 2020-11-02

## 2020-12-12 ENCOUNTER — MYC REFILL (OUTPATIENT)
Dept: ENDOCRINOLOGY | Facility: CLINIC | Age: 20
End: 2020-12-12

## 2020-12-12 DIAGNOSIS — F90.9 ATTENTION DEFICIT HYPERACTIVITY DISORDER (ADHD), UNSPECIFIED ADHD TYPE: ICD-10-CM

## 2020-12-18 RX ORDER — LISDEXAMFETAMINE DIMESYLATE 20 MG/1
20 CAPSULE ORAL EVERY MORNING
Qty: 30 CAPSULE | Refills: 0 | Status: SHIPPED | OUTPATIENT
Start: 2020-12-18 | End: 2021-01-29 | Stop reason: DRUGHIGH

## 2021-01-04 ENCOUNTER — HEALTH MAINTENANCE LETTER (OUTPATIENT)
Age: 21
End: 2021-01-04

## 2021-01-29 ENCOUNTER — VIRTUAL VISIT (OUTPATIENT)
Dept: ENDOCRINOLOGY | Facility: CLINIC | Age: 21
End: 2021-01-29
Payer: COMMERCIAL

## 2021-01-29 VITALS — HEIGHT: 73 IN | WEIGHT: 300 LBS | BODY MASS INDEX: 39.76 KG/M2

## 2021-01-29 DIAGNOSIS — E66.01 OBESITY, CLASS III, BMI 40-49.9 (MORBID OBESITY) (H): ICD-10-CM

## 2021-01-29 DIAGNOSIS — F90.9 ATTENTION DEFICIT HYPERACTIVITY DISORDER (ADHD), UNSPECIFIED ADHD TYPE: ICD-10-CM

## 2021-01-29 PROCEDURE — 99212 OFFICE O/P EST SF 10 MIN: CPT | Mod: 95 | Performed by: INTERNAL MEDICINE

## 2021-01-29 RX ORDER — DEXTROAMPHETAMINE SACCHARATE, AMPHETAMINE ASPARTATE, DEXTROAMPHETAMINE SULFATE AND AMPHETAMINE SULFATE 2.5; 2.5; 2.5; 2.5 MG/1; MG/1; MG/1; MG/1
TABLET ORAL
COMMUNITY
Start: 2019-05-13 | End: 2021-01-29 | Stop reason: ALTCHOICE

## 2021-01-29 RX ORDER — LISDEXAMFETAMINE DIMESYLATE 30 MG/1
30 CAPSULE ORAL EVERY MORNING
Qty: 30 CAPSULE | Refills: 0 | Status: SHIPPED | OUTPATIENT
Start: 2021-03-01 | End: 2021-05-05

## 2021-01-29 RX ORDER — LISDEXAMFETAMINE DIMESYLATE 30 MG/1
30 CAPSULE ORAL EVERY MORNING
Qty: 30 CAPSULE | Refills: 0 | Status: SHIPPED | OUTPATIENT
Start: 2021-01-29 | End: 2021-01-29

## 2021-01-29 RX ORDER — DEXTROAMPHETAMINE SACCHARATE, AMPHETAMINE ASPARTATE MONOHYDRATE, DEXTROAMPHETAMINE SULFATE AND AMPHETAMINE SULFATE 5; 5; 5; 5 MG/1; MG/1; MG/1; MG/1
CAPSULE, EXTENDED RELEASE ORAL
COMMUNITY
Start: 2019-05-13 | End: 2021-01-29 | Stop reason: ALTCHOICE

## 2021-01-29 RX ORDER — PRENATAL VIT 91/IRON/FOLIC/DHA 28-975-200
COMBINATION PACKAGE (EA) ORAL
COMMUNITY

## 2021-01-29 RX ORDER — METFORMIN HYDROCHLORIDE 1000 MG/1
TABLET, FILM COATED, EXTENDED RELEASE ORAL
Qty: 120 TABLET | Refills: 1 | Status: SHIPPED | OUTPATIENT
Start: 2021-01-29 | End: 2021-06-05 | Stop reason: DRUGHIGH

## 2021-01-29 RX ORDER — KETOCONAZOLE 20 MG/G
CREAM TOPICAL
COMMUNITY

## 2021-01-29 RX ORDER — FLUCONAZOLE 150 MG/1
TABLET ORAL
COMMUNITY

## 2021-01-29 ASSESSMENT — MIFFLIN-ST. JEOR: SCORE: 2428.32

## 2021-01-29 ASSESSMENT — PAIN SCALES - GENERAL: PAINLEVEL: NO PAIN (0)

## 2021-01-29 NOTE — PROGRESS NOTES
"Return Medical Weight Management Note  Ronny Hancock  MRN:  [id number]  :  2000  AMBER:  2021    Dear Juanjose Norris (Inactive),    I had the pleasure of seeing your patient Ronny Hancock for follow-up consultation.  He is a 20 year old male who I am continuing to see for treatment of obesity related to:       2020   I have the following health issues associated with obesity: None of the above   I have the following symptoms associated with obesity: None of the above     INTERVAL HISTORY:  Doing well,see below.     CURRENT WEIGHT:   300 lbs 0 oz    Wt Readings from Last 4 Encounters:   21 136.1 kg (300 lb)   20 144.9 kg (319 lb 8 oz) (>99 %, Z= 3.25)*     * Growth percentiles are based on CDC (Boys, 2-20 Years) data.       Height:  6' 1.23\"  Body Mass Index:  Body mass index is 39.33 kg/m .  Vitals:  B/P: Data Unavailable, P: Data Unavailable, R: Data Unavailable       Diet Recall Review with Patient 2020   Do you typically eat breakfast? No   Do you typically eat lunch? Yes   Do you typically eat supper? Yes   Do you typically eat snacks? Yes   Do you like vegetables?  Yes   Do you drink water? Yes   How many glasses of juice do you drink in a typical day? 0   How many of glasses of milk do you drink in a typical day? 0   If you do drink milk, what type? N/A   How many 8oz glasses of sugar containing drinks such as Aguilar-Aid/sweet tea do you drink in a day? 0   How many cans/bottles of sugar pop/soda/tea/sports drinks do you drink in a day? 3   How many cans/bottles of diet pop/soda/tea or sports drink do you drink in a day? 3   How often do you have a drink of alcohol? Never       MEDICATIONS:   Current Outpatient Medications   Medication     fluconazole (DIFLUCAN) 150 MG tablet     ketoconazole (NIZORAL) 2 % external cream     [START ON 3/1/2021] lisdexamfetamine (VYVANSE) 30 MG capsule     metFORMIN (GLUCOPHAGE-XR) 500 MG 24 hr tablet     metFORMIN (GLUMETZA) 1000 MG 24 hr " tablet     terbinafine (LAMISIL) 1 % external cream     No current facility-administered medications for this visit.        Weight Loss Medication History Reviewed With Patient 10/29/2020   Which weight loss medications are you currently taking on a regular basis?  None   If you are not taking a weight loss medication that was prescribed to you, please indicate why: Other   Are you having any side effects from the weight loss medication that we have prescribed you? No       LABS:  No results found for: A1C  No results found for: CHOL  No results found for: TSH  No results found for: CR  No results found for: ALT    ASSESSMENT:  Mr. Hancock is a 20 year old male with history of Class 3 obesity with current body mass index is 39.33 kg/m . and with current health consequences who presents for weight management follow-up consultation. Overall Ronny Hancock seems to be making healthier habits.     The following diagnoses are relevant to Ronny Hancock's history of obesity:     PLAN:    Problem   Obesity, Class III, Bmi 40-49.9 (Morbid Obesity) (H)    Oct 2020: Restarting vyvanse, as this had helped with decreased binge eating as well as ADHD. Starting metformin for help with weight, drawing labs. Is walking regularly (2-3 days/week).   - starting metformin  - restarting vyvanse   - continuing walking, starting weighing    Jan2021: taking vyvanse 20mg daily. Is still walking most days.  Does not know his current weight.Sleep is going well. School is going well. Is living at home currently because of Covid.   - will increase to vyvanse 30mg daily  - 2,000mg metformin         No problem-specific Assessment & Plan notes found for this encounter.      No orders of the defined types were placed in this encounter.       With motivational interviewing, Ronny took part in making the following plan:  There are no Patient Instructions on file for this visit.    FOLLOW-UP:    8 weeks.    Time: ** min spent on evaluation,  management, counseling, education, & motivational interviewing with greater than 50 % of the total time was spent on counseling and coordinating care    Thank you for including me in the care of your patient.  Please do not hesitate to call with questions or concerns.    Sincerely,    Jacqueline Hernandez MD MPH  Diplomate, American Board of Obesity Medicine, American Board of Internal Medicine, American Board of Pediatrics    Departments of Internal Medicine and Pediatrics  [facility]        [unfilled]     [place] is a 20 year old who is being evaluated via a billable video visit.      How would you like to obtain your AVS? MyChart  If the video visit is dropped, the invitation should be resent by: Text to cell phone: [phone]  Will anyone else be joining your video visit? No      Video Duration: 15 min    Originating Location (pt. Location): Home    Distant Location (provider location):  [facility] WE[facility]NAGEMENT CLIN[facility] [place]     Platform used for Video Visit: FlyData

## 2021-01-29 NOTE — NURSING NOTE
"Chief Complaint   Patient presents with     RECHECK     Follow up mwm.       Vitals:    01/29/21 1514   Height: 1.86 m (6' 1.23\")       Body mass index is 41.89 kg/m .                            DERRELL FRANCO, EMT    "

## 2021-01-29 NOTE — LETTER
"2021       RE: Ronny Hancock  [address] [facility] [address]     Dear Colleague,    Thank you for referring your patient, Ronny Hancock, to the [facility]NAGEMENT [facility]IN[facility] [place] [facility]. Please see a copy of my visit note below.    Return Medical Weight Management Note  Ronny Hancock  MRN:  [id number]  :  2000  AMBER:  2021    Dear Juanjose Norris (Inactive),    I had the pleasure of seeing your patient Ronny Hancock for follow-up consultation.  He is a 20 year old male who I am continuing to see for treatment of obesity related to:       2020   I have the following health issues associated with obesity: None of the above   I have the following symptoms associated with obesity: None of the above     INTERVAL HISTORY:  Doing well,see below.     CURRENT WEIGHT:   300 lbs 0 oz    Wt Readings from Last 4 Encounters:   21 136.1 kg (300 lb)   20 144.9 kg (319 lb 8 oz) (>99 %, Z= 3.25)*     * Growth percentiles are based on CDC (Boys, 2-20 Years) data.       Height:  6' 1.23\"  Body Mass Index:  Body mass index is 39.33 kg/m .  Vitals:  B/P: Data Unavailable, P: Data Unavailable, R: Data Unavailable       Diet Recall Review with Patient 2020   Do you typically eat breakfast? No   Do you typically eat lunch? Yes   Do you typically eat supper? Yes   Do you typically eat snacks? Yes   Do you like vegetables?  Yes   Do you drink water? Yes   How many glasses of juice do you drink in a typical day? 0   How many of glasses of milk do you drink in a typical day? 0   If you do drink milk, what type? N/A   How many 8oz glasses of sugar containing drinks such as Aguilar-Aid/sweet tea do you drink in a day? 0   How many cans/bottles of sugar pop/soda/tea/sports drinks do you drink in a day? 3   How many cans/bottles of diet pop/soda/tea or sports drink do you drink in a day? 3   How often do you have a drink of " alcohol? Never       MEDICATIONS:   Current Outpatient Medications   Medication     fluconazole (DIFLUCAN) 150 MG tablet     ketoconazole (NIZORAL) 2 % external cream     [START ON 3/1/2021] lisdexamfetamine (VYVANSE) 30 MG capsule     metFORMIN (GLUCOPHAGE-XR) 500 MG 24 hr tablet     metFORMIN (GLUMETZA) 1000 MG 24 hr tablet     terbinafine (LAMISIL) 1 % external cream     No current facility-administered medications for this visit.        Weight Loss Medication History Reviewed With Patient 10/29/2020   Which weight loss medications are you currently taking on a regular basis?  None   If you are not taking a weight loss medication that was prescribed to you, please indicate why: Other   Are you having any side effects from the weight loss medication that we have prescribed you? No       LABS:  No results found for: A1C  No results found for: CHOL  No results found for: TSH  No results found for: CR  No results found for: ALT    ASSESSMENT:  Mr. Hancock is a 20 year old male with history of Class 3 obesity with current body mass index is 39.33 kg/m . and with current health consequences who presents for weight management follow-up consultation. Overall Ronny Hancock seems to be making healthier habits.     The following diagnoses are relevant to Ronny Hancock's history of obesity:     PLAN:    Problem   Obesity, Class III, Bmi 40-49.9 (Morbid Obesity) (H)    Oct 2020: Restarting vyvanse, as this had helped with decreased binge eating as well as ADHD. Starting metformin for help with weight, drawing labs. Is walking regularly (2-3 days/week).   - starting metformin  - restarting vyvanse   - continuing walking, starting weighing    Jan2021: taking vyvanse 20mg daily. Is still walking most days.  Does not know his current weight.Sleep is going well. School is going well. Is living at home currently because of Covid.   - will increase to vyvanse 30mg daily  - 2,000mg metformin         No problem-specific Assessment &  Plan notes found for this encounter.      No orders of the defined types were placed in this encounter.       With motivational interviewing, Ronny took part in making the following plan:  There are no Patient Instructions on file for this visit.    FOLLOW-UP:    8 weeks.    Time: ** min spent on evaluation, management, counseling, education, & motivational interviewing with greater than 50 % of the total time was spent on counseling and coordinating care    Thank you for including me in the care of your patient.  Please do not hesitate to call with questions or concerns.    Sincerely,    Jacqueline Hernandez MD MPH  Diplomate, American Board of Obesity Medicine, American Board of Internal Medicine, American Board of Pediatrics    Departments of Internal Medicine and Pediatrics  [facility]        [unfilled]     [place] [employer] is a 20 year old who is being evaluated via a billable video visit.      How would you like to obtain your AVS? MyChart  If the video visit is dropped, the invitation should be resent by: Text to cell phone: [phone]  Will anyone else be joining your video visit? No      Video Duration: 15 min    Originating Location (pt. Location): Home    Distant Location (provider location):  [facility] WEIGHT MANAGEMENT CLINIC [place]     Platform used for Video Visit: [employer]      Again, thank you for allowing me to participate in the care of your patient.      Sincerely,    Jacqueline Hernandez MD

## 2021-01-29 NOTE — LETTER
Date:February 14, 2021      Provider requested that no letter be sent. Do not send.       [facility]

## 2021-02-03 ENCOUNTER — TELEPHONE (OUTPATIENT)
Dept: ENDOCRINOLOGY | Facility: CLINIC | Age: 21
End: 2021-02-03

## 2021-02-03 NOTE — TELEPHONE ENCOUNTER
Prior Authorization Retail Medication Request    Medication/Dose: Metformin  ICD code (if different than what is on RX):  Obesity, Class III, BMI 40-49.9 (morbid obesity) (H) [E66.01]     Previously Tried and Failed:  History of diet and exercise  Rationale:  Mr. Hancock is a 20 year old male with history of Class2  obesity with current body mass index is 38.89 kg/m . and with current health consequences who presents for weight management follow-up consultation.  Currently taking Vyvanse.     Insurance Name:    Insurance ID:        Pharmacy Information (if different than what is on RX)  Name:  [id number] [place], [place] - [address]  Phone:  [phone]

## 2021-02-05 NOTE — TELEPHONE ENCOUNTER
Central Prior Authorization Team   Phone: [phone]      PA Initiation    Medication: Metformin-PA initiated  Insurance Company: [employer] - Phone [phone] Fax [phone]  Pharmacy Filling the Rx: [facility] #[id number] [place], [place] - [address] [facility] [address]  Filling Pharmacy Phone: [phone]  Filling Pharmacy Fax:    Start Date: 2/5/2021

## 2021-02-09 RX ORDER — METFORMIN HCL 500 MG
2000 TABLET, EXTENDED RELEASE 24 HR ORAL DAILY
Qty: 120 TABLET | Refills: 1 | Status: SHIPPED | OUTPATIENT
Start: 2021-02-09 | End: 2021-05-27

## 2021-02-09 NOTE — TELEPHONE ENCOUNTER
PRIOR AUTHORIZATION DENIED    Medication: Metformin-PA denied    Denial Date: 2/9/2021    Denial Rational:         Appeal Information:

## 2021-04-25 ENCOUNTER — HEALTH MAINTENANCE LETTER (OUTPATIENT)
Age: 21
End: 2021-04-25

## 2021-05-05 ENCOUNTER — MYC REFILL (OUTPATIENT)
Dept: ENDOCRINOLOGY | Facility: CLINIC | Age: 21
End: 2021-05-05

## 2021-05-05 DIAGNOSIS — E66.01 OBESITY, CLASS III, BMI 40-49.9 (MORBID OBESITY) (H): ICD-10-CM

## 2021-05-05 DIAGNOSIS — F90.9 ATTENTION DEFICIT HYPERACTIVITY DISORDER (ADHD), UNSPECIFIED ADHD TYPE: ICD-10-CM

## 2021-05-05 RX ORDER — LISDEXAMFETAMINE DIMESYLATE 30 MG/1
30 CAPSULE ORAL EVERY MORNING
Qty: 30 CAPSULE | Refills: 0 | Status: SHIPPED | OUTPATIENT
Start: 2021-05-05 | End: 2021-05-27

## 2021-05-27 ENCOUNTER — VIRTUAL VISIT (OUTPATIENT)
Dept: ENDOCRINOLOGY | Facility: CLINIC | Age: 21
End: 2021-05-27
Payer: COMMERCIAL

## 2021-05-27 VITALS — BODY MASS INDEX: 36.52 KG/M2 | HEIGHT: 76 IN

## 2021-05-27 DIAGNOSIS — E66.01 OBESITY, CLASS III, BMI 40-49.9 (MORBID OBESITY) (H): Primary | ICD-10-CM

## 2021-05-27 DIAGNOSIS — F90.9 ATTENTION DEFICIT HYPERACTIVITY DISORDER (ADHD), UNSPECIFIED ADHD TYPE: ICD-10-CM

## 2021-05-27 PROCEDURE — 99212 OFFICE O/P EST SF 10 MIN: CPT | Mod: 95 | Performed by: INTERNAL MEDICINE

## 2021-05-27 RX ORDER — METFORMIN HCL 500 MG
2000 TABLET, EXTENDED RELEASE 24 HR ORAL DAILY
Qty: 120 TABLET | Refills: 1 | Status: SHIPPED | OUTPATIENT
Start: 2021-05-27 | End: 2022-08-26

## 2021-05-27 RX ORDER — LISDEXAMFETAMINE DIMESYLATE 30 MG/1
30 CAPSULE ORAL EVERY MORNING
Qty: 30 CAPSULE | Refills: 0 | Status: SHIPPED | OUTPATIENT
Start: 2021-06-04 | End: 2021-05-27

## 2021-05-27 RX ORDER — LISDEXAMFETAMINE DIMESYLATE 30 MG/1
30 CAPSULE ORAL EVERY MORNING
Qty: 30 CAPSULE | Refills: 0 | Status: SHIPPED | OUTPATIENT
Start: 2021-07-02 | End: 2021-12-16

## 2021-05-27 ASSESSMENT — PAIN SCALES - GENERAL: PAINLEVEL: NO PAIN (0)

## 2021-05-27 NOTE — LETTER
"2021       RE: Ronny Hancock  [address] [address]     Dear Colleague,    Thank you for referring your patient, Ronny Hancock, to the [facility] WEIGHT MANAGEMENT CLINIC [place] at [facility]. Please see a copy of my visit note below.    Return Medical Weight Management Note  Ronny Hancock  MRN:  [id number]  :  2000  AMBER:  2021    Dear Juanjose Norris (Inactive),    I had the pleasure of seeing your patient Ronny Hancock for follow-up consultation.  He is a 20 year old male who I am continuing to see for treatment of obesity related to:       2020   I have the following health issues associated with obesity: None of the above   I have the following symptoms associated with obesity: None of the above       INTERVAL HISTORY:  Doing well.     CURRENT WEIGHT:   0 lbs 0 oz    Wt Readings from Last 4 Encounters:   21 136.1 kg (300 lb)   20 144.9 kg (319 lb 8 oz) (>99 %, Z= 3.25)*   17 130.4 kg (287 lb 9 oz) (>99 %, Z= 3.07)*     * Growth percentiles are based on CDC (Boys, 2-20 Years) data.       Height:  6' 4\"  Body Mass Index:  Body mass index is 36.52 kg/m .  Vitals:  B/P: Data Unavailable, P: Data Unavailable, R: Data Unavailable       Diet Recall Review with Patient 2020   Do you typically eat breakfast? No   Do you typically eat lunch? Yes   Do you typically eat supper? Yes   Do you typically eat snacks? Yes   Do you like vegetables?  Yes   Do you drink water? Yes   How many glasses of juice do you drink in a typical day? 0   How many of glasses of milk do you drink in a typical day? 0   If you do drink milk, what type? N/A   How many 8oz glasses of sugar containing drinks such as Aguilar-Aid/sweet tea do you drink in a day? 0   How many cans/bottles of sugar pop/soda/tea/sports drinks do you drink in a day? 3   How many cans/bottles of diet pop/soda/tea or sports drink do you drink in a day? 3 "   How often do you have a drink of alcohol? Never       MEDICATIONS:   Current Outpatient Medications   Medication     fluconazole (DIFLUCAN) 150 MG tablet     ketoconazole (NIZORAL) 2 % external cream     lisdexamfetamine (VYVANSE) 30 MG capsule     [START ON 7/2/2021] lisdexamfetamine (VYVANSE) 30 MG capsule     metFORMIN (GLUCOPHAGE-XR) 500 MG 24 hr tablet     terbinafine (LAMISIL) 1 % external cream     No current facility-administered medications for this visit.        Weight Loss Medication History Reviewed With Patient 5/27/2021   Which weight loss medications are you currently taking on a regular basis?  Vyvanse   If you are not taking a weight loss medication that was prescribed to you, please indicate why: -   Are you having any side effects from the weight loss medication that we have prescribed you? No       LABS:  No results found for: A1C  No results found for: CHOL  No results found for: TSH  No results found for: CR  No results found for: ALT    ASSESSMENT:  Mr. Hancock is a 20 year old male with history of Class 2 obesity with current body mass index is 36.52 kg/m . and with current health consequences who presents for weight management follow-up consultation. Overall it is hard to tell how [place] is doing because he doesn't have a scale.     The following diagnoses are relevant to Ronny Hancock's history of obesity:     PLAN:    Problem   Obesity, Class III, Bmi 40-49.9 (Morbid Obesity) (H)    Oct 2020: Restarting vyvanse, as this had helped with decreased binge eating as well as ADHD. Starting metformin for help with weight, drawing labs. Is walking regularly (2-3 days/week).   - starting metformin  - restarting vyvanse   - continuing walking, starting weighing    Jan2021: taking vyvanse 20mg daily. Is still walking most days.  Does not know his current weight.Sleep is going well. School is going well. Is living at home currently because of Covid.   - will increase to vyvanse 30mg daily  -  2,000mg metformin     May 2021: He is not sure what his weight is. There is a scale in his house, but in someone else's room. Discussed getting a scale he feels comfortable using in his room. Will refill vyvanse and metformin. He is working on sleep hygiene and exercise  - follow-up 2 to 3 months  - labs         No problem-specific Assessment & Plan notes found for this encounter.      Orders Placed This Encounter   Procedures     Comprehensive metabolic panel     Hemoglobin A1c     Lipid Profile     TSH     Vitamin D Deficiency        With motivational interviewing, Ronny took part in making the following plan:  Patient Instructions   I am ordering labs today.  If you go to any [place] or [facility] lab location near you, they will see the orders in the system.  You can call ahead to make a lab appointment.  I will release the results to [employer], and then we can discuss them at our next appointment.   If you have questions about them before our next appointment, please feel free to respond to the labs in [employer] and let me know if you would like to discuss them before our next visit.    I will refill the vyvanse, and the metformin.     And please get a scale.       FOLLOW-UP:    6 weeks.    15 minutes spent on the date of the encounter doing chart review, history and exam, documentation and further activities as noted above.    Thank you for including me in the care of your patient.  Please do not hesitate to call with questions or concerns.    Sincerely,    Jacqueline Hernandez MD MPH  Diplomate, American Board of Obesity Medicine, American Board of Internal Medicine, American Board of Pediatrics    Departments of Internal Medicine and Pediatrics  [facility]        [unfilled]       [place] is a 20 year old who is being evaluated via a billable video visit.      How would you like to obtain your AVS? [facility]  If the video visit is dropped, the invitation should be resent  by: Text to cell phone: [phone]  Will anyone else be joining your video visit? No      Video Duration: 12 min    Originating Location (pt. Location): Home    Distant Location (provider location):  [facility] WEIGHT MANAGEMENT CLINIC [place]     Platform used for Video Visit: Lisa        Again, thank you for allowing me to participate in the care of your patient.      Sincerely,    Jacqueline Hernandez MD

## 2021-05-27 NOTE — PROGRESS NOTES
"Return Medical Weight Management Note  Ronny Hancock  MRN:  [id number]  :  2000  AMBER:  2021    Dear Juanjose Norris (Inactive),    I had the pleasure of seeing your patient Ronny Hancock for follow-up consultation.  He is a 20 year old male who I am continuing to see for treatment of obesity related to:       2020   I have the following health issues associated with obesity: None of the above   I have the following symptoms associated with obesity: None of the above       INTERVAL HISTORY:  Doing well.     CURRENT WEIGHT:   0 lbs 0 oz    Wt Readings from Last 4 Encounters:   21 136.1 kg (300 lb)   20 144.9 kg (319 lb 8 oz) (>99 %, Z= 3.25)*   17 130.4 kg (287 lb 9 oz) (>99 %, Z= 3.07)*     * Growth percentiles are based on CDC (Boys, 2-20 Years) data.       Height:  6' 4\"  Body Mass Index:  Body mass index is 36.52 kg/m .  Vitals:  B/P: Data Unavailable, P: Data Unavailable, R: Data Unavailable       Diet Recall Review with Patient 2020   Do you typically eat breakfast? No   Do you typically eat lunch? Yes   Do you typically eat supper? Yes   Do you typically eat snacks? Yes   Do you like vegetables?  Yes   Do you drink water? Yes   How many glasses of juice do you drink in a typical day? 0   How many of glasses of milk do you drink in a typical day? 0   If you do drink milk, what type? N/A   How many 8oz glasses of sugar containing drinks such as Aguilar-Aid/sweet tea do you drink in a day? 0   How many cans/bottles of sugar pop/soda/tea/sports drinks do you drink in a day? 3   How many cans/bottles of diet pop/soda/tea or sports drink do you drink in a day? 3   How often do you have a drink of alcohol? Never       MEDICATIONS:   Current Outpatient Medications   Medication     fluconazole (DIFLUCAN) 150 MG tablet     ketoconazole (NIZORAL) 2 % external cream     lisdexamfetamine (VYVANSE) 30 MG capsule     [START ON 2021] lisdexamfetamine (VYVANSE) 30 MG capsule     " metFORMIN (GLUCOPHAGE-XR) 500 MG 24 hr tablet     terbinafine (LAMISIL) 1 % external cream     No current facility-administered medications for this visit.        Weight Loss Medication History Reviewed With Patient 5/27/2021   Which weight loss medications are you currently taking on a regular basis?  Vyvanse   If you are not taking a weight loss medication that was prescribed to you, please indicate why: -   Are you having any side effects from the weight loss medication that we have prescribed you? No       LABS:  No results found for: A1C  No results found for: CHOL  No results found for: TSH  No results found for: CR  No results found for: ALT    ASSESSMENT:  Mr. Hancock is a 20 year old male with history of Class 2 obesity with current body mass index is 36.52 kg/m . and with current health consequences who presents for weight management follow-up consultation. Overall it is hard to tell how Ronny is doing because he doesn't have a scale.     The following diagnoses are relevant to Ronny Hancock's history of obesity:     PLAN:    Problem   Obesity, Class III, Bmi 40-49.9 (Morbid Obesity) (H)    Oct 2020: Restarting vyvanse, as this had helped with decreased binge eating as well as ADHD. Starting metformin for help with weight, drawing labs. Is walking regularly (2-3 days/week).   - starting metformin  - restarting vyvanse   - continuing walking, starting weighing    Jan2021: taking vyvanse 20mg daily. Is still walking most days.  Does not know his current weight.Sleep is going well. School is going well. Is living at home currently because of Covid.   - will increase to vyvanse 30mg daily  - 2,000mg metformin     May 2021: He is not sure what his weight is. There is a scale in his house, but in someone else's room. Discussed getting a scale he feels comfortable using in his room. Will refill vyvanse and metformin. He is working on sleep hygiene and exercise  - follow-up 2 to 3 months  - labs         No  problem-specific Assessment & Plan notes found for this encounter.      Orders Placed This Encounter   Procedures     Comprehensive metabolic panel     Hemoglobin A1c     Lipid Profile     TSH     Vitamin D Deficiency        With motivational interviewing, Ronny took part in making the following plan:  Patient Instructions   I am ordering labs today.  If you go to any [place] or [facility] lab location near you, they will see the orders in the system.  You can call ahead to make a lab appointment.  I will release the results to [employer], and then we can discuss them at our next appointment.   If you have questions about them before our next appointment, please feel free to respond to the labs in [employer] and let me know if you would like to discuss them before our next visit.    I will refill the vyvanse, and the metformin.     And please get a scale.       FOLLOW-UP:    6 weeks.    15 minutes spent on the date of the encounter doing chart review, history and exam, documentation and further activities as noted above.    Thank you for including me in the care of your patient.  Please do not hesitate to call with questions or concerns.    Sincerely,    Jacqueline Hernandez MD MPH  Diplomate, American Board of Obesity Medicine, American Board of Internal Medicine, American Board of Pediatrics    Departments of Internal Medicine and Pediatrics  [facility]        [unfilled]       [place] is a 20 year old who is being evaluated via a billable video visit.      How would you like to obtain your AVS? [facility]  If the video visit is dropped, the invitation should be resent by: Text to cell phone: [phone]  Will anyone else be joining your video visit? No      Video Duration: 12 min    Originating Location (pt. Location): Home    Distant Location (provider location):  [facility] WEIGHT MANAGEMENT CLINIC [place]     Platform used for Video Visit: [employer]

## 2021-05-27 NOTE — LETTER
Date:June 18, 2021      Patient was self referred, no letter generated. Do not send.        [facility] Health Information

## 2021-05-27 NOTE — PATIENT INSTRUCTIONS
I am ordering labs today.  If you go to any [place] or [facility] lab location near you, they will see the orders in the system.  You can call ahead to make a lab appointment.  I will release the results to [employer], and then we can discuss them at our next appointment.   If you have questions about them before our next appointment, please feel free to respond to the labs in [employer] and let me know if you would like to discuss them before our next visit.    I will refill the vyvanse, and the metformin.     And please get a scale.

## 2021-05-31 VITALS — WEIGHT: 287.56 LBS | HEIGHT: 74 IN | BODY MASS INDEX: 36.9 KG/M2

## 2021-06-05 RX ORDER — LISDEXAMFETAMINE DIMESYLATE 30 MG/1
30 CAPSULE ORAL EVERY MORNING
Qty: 30 CAPSULE | Refills: 0 | Status: SHIPPED | OUTPATIENT
Start: 2021-06-05 | End: 2021-10-11

## 2021-06-14 NOTE — ANESTHESIA POSTPROCEDURE EVALUATION
Patient: Ronny DAVIS White  APPENDECTOMY, LAPAROSCOPIC  Anesthesia type: general    Patient location: PACU  Last vitals:   Vitals:    11/29/17 0930   BP: 128/65   Pulse: 73   Resp: 17   Temp:    SpO2: 98%     Post vital signs: stable  Level of consciousness: awake and responds to simple questions  Post-anesthesia pain: pain controlled  Post-anesthesia nausea and vomiting: no  Pulmonary: unassisted, return to baseline  Cardiovascular: stable and blood pressure at baseline  Hydration: adequate  Anesthetic events: no    QCDR Measures:  ASA# 11 - Miguelina-op Cardiac Arrest: ASA11B - Patient did NOT experience unanticipated cardiac arrest  ASA# 12 - Miguelina-op Mortality Rate: ASA12B - Patient did NOT die  ASA# 13 - PACU Re-Intubation Rate: ASA13B - Patient did NOT require a new airway mgmt  ASA# 10 - Composite Anes Safety: ASA10A - No serious adverse event    Additional Notes:

## 2021-06-14 NOTE — ANESTHESIA CARE TRANSFER NOTE
Last vitals:   Vitals:    11/29/17 0839   BP: 132/80   Pulse: 97   Resp: 16   Temp: 37.1  C (98.7  F)   SpO2: 99%     Patient's level of consciousness is drowsy  Spontaneous respirations: yes  Maintains airway independently: yes  Dentition unchanged: yes  Oropharynx: oropharynx clear of all foreign objects    QCDR Measures:  ASA# 20 - Surgical Safety Checklist: WHO surgical safety checklist completed prior to induction  PQRS# 430 - Adult PONV Prevention: 4558F - Pt received => 2 anti-emetic agents (different classes) preop & intraop  ASA# 8 - Peds PONV Prevention: NA - Not pediatric patient, not GA or 2 or more risk factors NOT present  PQRS# 424 - Miguelina-op Temp Management: 4559F - At least one body temp DOCUMENTED => 35.5C or 95.9F within required timeframe  PQRS# 426 - PACU Transfer Protocol: - Transfer of care checklist used  ASA# 14 - Acute Post-op Pain: ASA14B - Patient did NOT experience pain >= 7 out of 10   Breathing spontaneously with adequate tidal volume, patient opens eyes to name, oral pharynx suctioned, and ETT removed with good air exchange.

## 2021-06-14 NOTE — ANESTHESIA PREPROCEDURE EVALUATION
Anesthesia Evaluation      Patient summary reviewed   No history of anesthetic complications     Airway   Mallampati: II  Neck ROM: full   Pulmonary - normal exam    breath sounds clear to auscultation  (-) asthma, shortness of breath, recent URI, sleep apnea                         Cardiovascular - normal exam  Exercise tolerance: > or = 4 METS  (-) angina  Rhythm: regular  Rate: normal,         Neuro/Psych    (-) no seizures, no neuromuscular disease, no CVA    Endo/Other    (+) obesity,      GI/Hepatic/Renal       Other findings: ADHD      Dental    (+) caps                       Anesthesia Plan  Planned anesthetic: general endotracheal  Modified RSI with rocuronium    ASA 3   Induction: intravenous   Anesthetic plan and risks discussed with: patient and parent/guardian  Anesthesia plan special considerations: rapid sequence induction, antiemetics,

## 2021-06-29 ENCOUNTER — TELEPHONE (OUTPATIENT)
Dept: PEDIATRICS | Facility: CLINIC | Age: 21
End: 2021-06-29

## 2021-10-10 ENCOUNTER — HEALTH MAINTENANCE LETTER (OUTPATIENT)
Age: 21
End: 2021-10-10

## 2021-10-10 ENCOUNTER — MYC REFILL (OUTPATIENT)
Dept: ENDOCRINOLOGY | Facility: CLINIC | Age: 21
End: 2021-10-10

## 2021-10-10 DIAGNOSIS — F90.9 ATTENTION DEFICIT HYPERACTIVITY DISORDER (ADHD), UNSPECIFIED ADHD TYPE: ICD-10-CM

## 2021-10-11 ENCOUNTER — MYC REFILL (OUTPATIENT)
Dept: ENDOCRINOLOGY | Facility: CLINIC | Age: 21
End: 2021-10-11

## 2021-10-11 DIAGNOSIS — F90.9 ATTENTION DEFICIT HYPERACTIVITY DISORDER (ADHD), UNSPECIFIED ADHD TYPE: ICD-10-CM

## 2021-10-13 RX ORDER — LISDEXAMFETAMINE DIMESYLATE 30 MG/1
30 CAPSULE ORAL EVERY MORNING
Qty: 30 CAPSULE | Refills: 0 | Status: SHIPPED | OUTPATIENT
Start: 2021-10-13 | End: 2021-11-08

## 2021-10-13 RX ORDER — LISDEXAMFETAMINE DIMESYLATE 30 MG/1
30 CAPSULE ORAL EVERY MORNING
Qty: 30 CAPSULE | Refills: 0 | Status: SHIPPED | OUTPATIENT
Start: 2021-10-13 | End: 2021-11-12

## 2021-11-08 ENCOUNTER — MYC REFILL (OUTPATIENT)
Dept: ENDOCRINOLOGY | Facility: CLINIC | Age: 21
End: 2021-11-08
Payer: COMMERCIAL

## 2021-11-08 DIAGNOSIS — F90.9 ATTENTION DEFICIT HYPERACTIVITY DISORDER (ADHD), UNSPECIFIED ADHD TYPE: ICD-10-CM

## 2021-11-12 DIAGNOSIS — F90.9 ATTENTION DEFICIT HYPERACTIVITY DISORDER (ADHD), UNSPECIFIED ADHD TYPE: ICD-10-CM

## 2021-11-12 RX ORDER — LISDEXAMFETAMINE DIMESYLATE 30 MG/1
30 CAPSULE ORAL EVERY MORNING
Qty: 30 CAPSULE | Refills: 0 | Status: SHIPPED | OUTPATIENT
Start: 2021-12-12 | End: 2021-12-16

## 2021-11-12 RX ORDER — LISDEXAMFETAMINE DIMESYLATE 30 MG/1
30 CAPSULE ORAL EVERY MORNING
Qty: 30 CAPSULE | Refills: 0 | Status: SHIPPED | OUTPATIENT
Start: 2021-11-12 | End: 2021-12-16

## 2021-12-16 ENCOUNTER — VIRTUAL VISIT (OUTPATIENT)
Dept: ENDOCRINOLOGY | Facility: CLINIC | Age: 21
End: 2021-12-16
Payer: COMMERCIAL

## 2021-12-16 VITALS — HEIGHT: 73 IN | BODY MASS INDEX: 41.75 KG/M2 | WEIGHT: 315 LBS

## 2021-12-16 DIAGNOSIS — E66.01 OBESITY, CLASS III, BMI 40-49.9 (MORBID OBESITY) (H): ICD-10-CM

## 2021-12-16 DIAGNOSIS — F90.9 ATTENTION DEFICIT HYPERACTIVITY DISORDER (ADHD), UNSPECIFIED ADHD TYPE: ICD-10-CM

## 2021-12-16 PROCEDURE — 99212 OFFICE O/P EST SF 10 MIN: CPT | Mod: 95 | Performed by: INTERNAL MEDICINE

## 2021-12-16 ASSESSMENT — MIFFLIN-ST. JEOR: SCORE: 2510.39

## 2021-12-16 NOTE — NURSING NOTE
"Chief Complaint   Patient presents with     RECHECK     Follow-up Weight Mangement       Vitals:    12/16/21 1502   Weight: 145.2 kg (320 lb)   Height: 1.854 m (6' 1\")       Body mass index is 42.22 kg/m .                     "

## 2021-12-16 NOTE — LETTER
"2021       RE: Ronny Hancock  [address] [address]     Dear Colleague,    Thank you for referring your patient, Ronny Hancock, to the [facility] WEIGHT MANAGEMENT CLINIC [place] at [facility]. Please see a copy of my visit note below.    Return Medical Weight Management Note  Ronny Hancock  MRN:  [id number]  :  2000  AMBER:  2021    Dear Juanjose Norris (Inactive),    I had the pleasure of seeing your patient Ronny Hancock for follow-up consultation.  He is a 21 year old male who I am continuing to see for treatment of obesity related to:       2020   I have the following health issues associated with obesity: None of the above   I have the following symptoms associated with obesity: None of the above       INTERVAL HISTORY:     CURRENT WEIGHT:   320 lbs 0 oz    Wt Readings from Last 4 Encounters:   21 145.2 kg (320 lb)   21 136.1 kg (300 lb)   20 144.9 kg (319 lb 8 oz) (>99 %, Z= 3.25)*   17 130.4 kg (287 lb 9 oz) (>99 %, Z= 3.07)*     * Growth percentiles are based on CDC (Boys, 2-20 Years) data.       Height:  6' 1\"  Body Mass Index:  Body mass index is 42.22 kg/m .  Vitals:  B/P: Data Unavailable, P: Data Unavailable, R: Data Unavailable     Diet Recall Review with Patient 2020   Do you typically eat breakfast? No   Do you typically eat lunch? Yes   Do you typically eat supper? Yes   Do you typically eat snacks? Yes   Do you like vegetables?  Yes   Do you drink water? Yes   How many glasses of juice do you drink in a typical day? 0   How many of glasses of milk do you drink in a typical day? 0   If you do drink milk, what type? N/A   How many 8oz glasses of sugar containing drinks such as Aguilar-Aid/sweet tea do you drink in a day? 0   How many cans/bottles of sugar pop/soda/tea/sports drinks do you drink in a day? 3   How many cans/bottles of diet pop/soda/tea or sports drink do you " drink in a day? 3   How often do you have a drink of alcohol? Never       MEDICATIONS:   Current Outpatient Medications   Medication     fluconazole (DIFLUCAN) 150 MG tablet     ketoconazole (NIZORAL) 2 % external cream     [START ON 2/16/2022] lisdexamfetamine (VYVANSE) 30 MG capsule     [START ON 1/16/2022] lisdexamfetamine (VYVANSE) 30 MG capsule     lisdexamfetamine (VYVANSE) 30 MG capsule     metFORMIN (GLUCOPHAGE-XR) 500 MG 24 hr tablet     semaglutide (OZEMPIC) 2 MG/1.5ML SOPN pen     semaglutide (OZEMPIC) 2 MG/1.5ML SOPN pen     terbinafine (LAMISIL) 1 % external cream     No current facility-administered medications for this visit.       Weight Loss Medication History Reviewed With Patient 5/27/2021   Which weight loss medications are you currently taking on a regular basis?  Vyvanse   If you are not taking a weight loss medication that was prescribed to you, please indicate why: -   Are you having any side effects from the weight loss medication that we have prescribed you? No       LABS:  No results found for: A1C  No results found for: CHOL  No results found for: TSH  No results found for: CR  No results found for: ALT    ASSESSMENT:  Mr. Hancock is a 21 year old male with history of Class 3 obesity with current body mass index is 42.22 kg/m . and with current health consequences who presents for weight management follow-up consultation. Overall [place] is maintaining.     The following diagnoses are relevant to Ronny Hancock's history of obesity:     PLAN:    Problem   Obesity, Class III, Bmi 40-49.9 (Morbid Obesity) (H)    Oct 2020: Restarting vyvanse, as this had helped with decreased binge eating as well as ADHD. Starting metformin for help with weight, drawing labs. Is walking regularly (2-3 days/week).   - starting metformin  - restarting vyvanse   - continuing walking, starting weighing    Jan2021: taking vyvanse 20mg daily. Is still walking most days.  Does not know his current weight.Sleep  is going well. School is going well. Is living at home currently because of Covid.   - will increase to vyvanse 30mg daily  - 2,000mg metformin     May 2021: He is not sure what his weight is. There is a scale in his house, but in someone else's room. Discussed getting a scale he feels comfortable using in his room. Will refill vyvanse and metformin. He is working on sleep hygiene and exercise  - follow-up 2 to 3 months  - labs     Dec 2021: Will continue vyvanse 30mg. Will also start semaglutide 0.25mg per week for at least 1 month. Then can increase to 0.5mg per week  - emphasized need to self-weigh        No problem-specific Assessment & Plan notes found for this encounter.      No orders of the defined types were placed in this encounter.      With motivational interviewing, Ronny took part in making the following plan:  Patient Instructions   Semaglutide:  - start with 0.25mg per week for at least 1 month  - if losing at least 1 pound per week, continue that does  - if not losing at least 1 pound per week, then after 4 weeks we will increase to 0.5mg per week    Self-weighing: it is important to weigh yourself at least once per week so we can tell if any medications are working.     You can use a smart scale that sends the weight to your phone to help track it over time.       FOLLOW-UP:    4 weeks.    15 minutes spent on the date of the encounter doing chart review, history and exam, documentation and further activities as noted above.    Thank you for including me in the care of your patient.  Please do not hesitate to call with questions or concerns.    Sincerely,    Jacqueline Hernandez MD MPH  Diplomate, American Board of Obesity Medicine, American Board of Internal Medicine, American Board of Pediatrics    Departments of Internal Medicine and Pediatrics  [facility]        [unfilled]       [place] is a 21 year old who is being evaluated via a billable video visit.       How would you like to obtain your AVS? MyChart  If the video visit is dropped, the invitation should be resent by: Text to cell phone: [phone]  Will anyone else be joining your video visit? No      Video Start Time: approximately 3pm  Video-Visit Details    Type of service:  Video Visit    Video End Time:approximately 3:10pm    Originating Location (pt. Location): Home    Distant Location (provider location):  [facility] WEIGHT MANAGEMENT CLINIC [place]     Platform used for Video Visit: Lisa      Again, thank you for allowing me to participate in the care of your patient.      Sincerely,    Jacqueline Hernandez MD

## 2021-12-16 NOTE — LETTER
Date:December 22, 2021      Patient was self referred, no letter generated. Do not send.        [facility] Health Information

## 2021-12-16 NOTE — PROGRESS NOTES
"Return Medical Weight Management Note  Ronny Hancock  MRN:  [id number]  :  2000  AMBER:  2021    Dear Juanjose Norris (Inactive),    I had the pleasure of seeing your patient Ronny Hancock for follow-up consultation.  He is a 21 year old male who I am continuing to see for treatment of obesity related to:       2020   I have the following health issues associated with obesity: None of the above   I have the following symptoms associated with obesity: None of the above       INTERVAL HISTORY:     CURRENT WEIGHT:   320 lbs 0 oz    Wt Readings from Last 4 Encounters:   21 145.2 kg (320 lb)   21 136.1 kg (300 lb)   20 144.9 kg (319 lb 8 oz) (>99 %, Z= 3.25)*   17 130.4 kg (287 lb 9 oz) (>99 %, Z= 3.07)*     * Growth percentiles are based on [facility] [place] (Boys, 2-20 Years) data.       Height:  6' 1\"  Body Mass Index:  Body mass index is 42.22 kg/m .  Vitals:  B/P: Data Unavailable, P: Data Unavailable, R: Data Unavailable     Diet Recall Review with Patient 2020   Do you typically eat breakfast? No   Do you typically eat lunch? Yes   Do you typically eat supper? Yes   Do you typically eat snacks? Yes   Do you like vegetables?  Yes   Do you drink water? Yes   How many glasses of juice do you drink in a typical day? 0   How many of glasses of milk do you drink in a typical day? 0   If you do drink milk, what type? N/A   How many 8oz glasses of sugar containing drinks such as Aguilar-Aid/sweet tea do you drink in a day? 0   How many cans/bottles of sugar pop/soda/tea/sports drinks do you drink in a day? 3   How many cans/bottles of diet pop/soda/tea or sports drink do you drink in a day? 3   How often do you have a drink of alcohol? Never       MEDICATIONS:   Current Outpatient Medications   Medication     fluconazole (DIFLUCAN) 150 MG tablet     ketoconazole (NIZORAL) 2 % external cream     [START ON 2022] lisdexamfetamine (VYVANSE) 30 MG capsule     [START ON 2022] " lisdexamfetamine (VYVANSE) 30 MG capsule     lisdexamfetamine (VYVANSE) 30 MG capsule     metFORMIN (GLUCOPHAGE-XR) 500 MG 24 hr tablet     semaglutide (OZEMPIC) 2 MG/1.5ML SOPN pen     semaglutide (OZEMPIC) 2 MG/1.5ML SOPN pen     terbinafine (LAMISIL) 1 % external cream     No current facility-administered medications for this visit.       Weight Loss Medication History Reviewed With Patient 5/27/2021   Which weight loss medications are you currently taking on a regular basis?  Vyvanse   If you are not taking a weight loss medication that was prescribed to you, please indicate why: -   Are you having any side effects from the weight loss medication that we have prescribed you? No       LABS:  No results found for: A1C  No results found for: CHOL  No results found for: TSH  No results found for: CR  No results found for: ALT    ASSESSMENT:  Mr. Hancock is a 21 year old male with history of Class 3 obesity with current body mass index is 42.22 kg/m . and with current health consequences who presents for weight management follow-up consultation. Overall Ronny Hancock is maintaining.     The following diagnoses are relevant to Ronny Hancock's history of obesity:     PLAN:    Problem   Obesity, Class III, Bmi 40-49.9 (Morbid Obesity) (H)    Oct 2020: Restarting vyvanse, as this had helped with decreased binge eating as well as ADHD. Starting metformin for help with weight, drawing labs. Is walking regularly (2-3 days/week).   - starting metformin  - restarting vyvanse   - continuing walking, starting weighing    Jan2021: taking vyvanse 20mg daily. Is still walking most days.  Does not know his current weight.Sleep is going well. School is going well. Is living at home currently because of Covid.   - will increase to vyvanse 30mg daily  - 2,000mg metformin     May 2021: He is not sure what his weight is. There is a scale in his house, but in someone else's room. Discussed getting a scale he feels comfortable using in  his room. Will refill vyvanse and metformin. He is working on sleep hygiene and exercise  - follow-up 2 to 3 months  - labs     Dec 2021: Will continue vyvanse 30mg. Will also start semaglutide 0.25mg per week for at least 1 month. Then can increase to 0.5mg per week  - emphasized need to self-weigh        No problem-specific Assessment & Plan notes found for this encounter.      No orders of the defined types were placed in this encounter.      With motivational interviewing, [place] took part in making the following plan:  Patient Instructions   Semaglutide:  - start with 0.25mg per week for at least 1 month  - if losing at least 1 pound per week, continue that does  - if not losing at least 1 pound per week, then after 4 weeks we will increase to 0.5mg per week    Self-weighing: it is important to weigh yourself at least once per week so we can tell if any medications are working.     You can use a smart scale that sends the weight to your phone to help track it over time.       FOLLOW-UP:    4 weeks.    15 minutes spent on the date of the encounter doing chart review, history and exam, documentation and further activities as noted above.    Thank you for including me in the care of your patient.  Please do not hesitate to call with questions or concerns.    Sincerely,    Jacqueline Hernandez MD MPH  Diplomate, American Board of Obesity Medicine, American Board of Internal Medicine, American Board of Pediatrics    Departments of Internal Medicine and Pediatrics  [facility]        [unfilled]       [place] is a 21 year old who is being evaluated via a billable video visit.      How would you like to obtain your AVS? MyChart  If the video visit is dropped, the invitation should be resent by: Text to cell phone: [phone]  Will anyone else be joining your video visit? No      Video Start Time: approximately 3pm  Video-Visit Details    Type of service:  Video Visit    Video End  Time:approximately 3:10pm    Originating Location (pt. Location): Home    Distant Location (provider location):  [facility]NAG[facility] [place]     Platform used for Video Visit: Lisa

## 2021-12-21 RX ORDER — LISDEXAMFETAMINE DIMESYLATE 30 MG/1
30 CAPSULE ORAL EVERY MORNING
Qty: 30 CAPSULE | Refills: 0 | Status: SHIPPED | OUTPATIENT
Start: 2022-01-16 | End: 2022-01-23

## 2021-12-21 RX ORDER — LISDEXAMFETAMINE DIMESYLATE 30 MG/1
30 CAPSULE ORAL EVERY MORNING
Qty: 30 CAPSULE | Refills: 0 | Status: SHIPPED | OUTPATIENT
Start: 2022-02-16 | End: 2022-08-18

## 2021-12-21 RX ORDER — LISDEXAMFETAMINE DIMESYLATE 30 MG/1
30 CAPSULE ORAL EVERY MORNING
Qty: 30 CAPSULE | Refills: 0 | Status: SHIPPED | OUTPATIENT
Start: 2021-12-21 | End: 2022-08-18

## 2021-12-22 ENCOUNTER — TELEPHONE (OUTPATIENT)
Dept: PEDIATRICS | Facility: CLINIC | Age: 21
End: 2021-12-22
Payer: COMMERCIAL

## 2021-12-22 NOTE — PATIENT INSTRUCTIONS
Semaglutide:  - start with 0.25mg per week for at least 1 month  - if losing at least 1 pound per week, continue that does  - if not losing at least 1 pound per week, then after 4 weeks we will increase to 0.5mg per week    Self-weighing: it is important to weigh yourself at least once per week so we can tell if any medications are working.     You can use a smart scale that sends the weight to your phone to help track it over time.

## 2021-12-22 NOTE — TELEPHONE ENCOUNTER
kaitlinm to schedule 3 month follow up with DR. NEWELL,left call center phone number and sent [place].

## 2022-01-23 ENCOUNTER — MYC REFILL (OUTPATIENT)
Dept: ENDOCRINOLOGY | Facility: CLINIC | Age: 22
End: 2022-01-23
Payer: COMMERCIAL

## 2022-01-23 DIAGNOSIS — F90.9 ATTENTION DEFICIT HYPERACTIVITY DISORDER (ADHD), UNSPECIFIED ADHD TYPE: ICD-10-CM

## 2022-01-24 RX ORDER — LISDEXAMFETAMINE DIMESYLATE 30 MG/1
30 CAPSULE ORAL EVERY MORNING
Qty: 30 CAPSULE | Refills: 0 | Status: SHIPPED | OUTPATIENT
Start: 2022-01-24 | End: 2022-05-21

## 2022-05-21 ENCOUNTER — MYC REFILL (OUTPATIENT)
Dept: ENDOCRINOLOGY | Facility: CLINIC | Age: 22
End: 2022-05-21

## 2022-05-21 DIAGNOSIS — F90.9 ATTENTION DEFICIT HYPERACTIVITY DISORDER (ADHD), UNSPECIFIED ADHD TYPE: ICD-10-CM

## 2022-05-22 ENCOUNTER — HEALTH MAINTENANCE LETTER (OUTPATIENT)
Age: 22
End: 2022-05-22

## 2022-05-25 RX ORDER — LISDEXAMFETAMINE DIMESYLATE 30 MG/1
30 CAPSULE ORAL EVERY MORNING
Qty: 30 CAPSULE | Refills: 0 | Status: SHIPPED | OUTPATIENT
Start: 2022-05-25 | End: 2022-06-28

## 2022-06-28 ENCOUNTER — MYC REFILL (OUTPATIENT)
Dept: ENDOCRINOLOGY | Facility: CLINIC | Age: 22
End: 2022-06-28

## 2022-06-28 DIAGNOSIS — F90.9 ATTENTION DEFICIT HYPERACTIVITY DISORDER (ADHD), UNSPECIFIED ADHD TYPE: ICD-10-CM

## 2022-06-30 RX ORDER — LISDEXAMFETAMINE DIMESYLATE 30 MG/1
30 CAPSULE ORAL EVERY MORNING
Qty: 30 CAPSULE | Refills: 0 | Status: SHIPPED | OUTPATIENT
Start: 2022-06-30 | End: 2022-07-11

## 2022-07-11 DIAGNOSIS — F90.9 ATTENTION DEFICIT HYPERACTIVITY DISORDER (ADHD), UNSPECIFIED ADHD TYPE: ICD-10-CM

## 2022-07-11 RX ORDER — LISDEXAMFETAMINE DIMESYLATE 30 MG/1
30 CAPSULE ORAL EVERY MORNING
Qty: 30 CAPSULE | Refills: 0 | Status: SHIPPED | OUTPATIENT
Start: 2022-07-11 | End: 2022-08-18

## 2022-07-11 RX ORDER — LISDEXAMFETAMINE DIMESYLATE 30 MG/1
30 CAPSULE ORAL EVERY MORNING
Qty: 30 CAPSULE | Refills: 0 | Status: SHIPPED | OUTPATIENT
Start: 2022-08-11 | End: 2024-07-24 | Stop reason: DRUGHIGH

## 2022-08-04 DIAGNOSIS — E66.01 CLASS 3 SEVERE OBESITY WITH SERIOUS COMORBIDITY IN ADULT, UNSPECIFIED BMI, UNSPECIFIED OBESITY TYPE (H): ICD-10-CM

## 2022-08-04 DIAGNOSIS — E66.813 CLASS 3 SEVERE OBESITY WITH SERIOUS COMORBIDITY IN ADULT, UNSPECIFIED BMI, UNSPECIFIED OBESITY TYPE (H): ICD-10-CM

## 2022-08-04 DIAGNOSIS — F90.9 ATTENTION DEFICIT HYPERACTIVITY DISORDER (ADHD), UNSPECIFIED ADHD TYPE: Primary | ICD-10-CM

## 2022-08-05 ENCOUNTER — APPOINTMENT (OUTPATIENT)
Dept: LAB | Facility: CLINIC | Age: 22
End: 2022-08-05
Payer: COMMERCIAL

## 2022-08-05 LAB
ALBUMIN SERPL-MCNC: 3.9 G/DL (ref 3.5–5)
ALP SERPL-CCNC: 93 U/L (ref 45–120)
ALT SERPL W P-5'-P-CCNC: 33 U/L (ref 0–45)
ANION GAP SERPL CALCULATED.3IONS-SCNC: 7 MMOL/L (ref 5–18)
AST SERPL W P-5'-P-CCNC: 19 U/L (ref 0–40)
BILIRUB SERPL-MCNC: 0.5 MG/DL (ref 0–1)
BUN SERPL-MCNC: 10 MG/DL (ref 8–22)
CALCIUM SERPL-MCNC: 9.1 MG/DL (ref 8.5–10.5)
CHLORIDE BLD-SCNC: 106 MMOL/L (ref 98–107)
CHOLEST SERPL-MCNC: 166 MG/DL
CO2 SERPL-SCNC: 27 MMOL/L (ref 22–31)
CREAT SERPL-MCNC: 0.96 MG/DL (ref 0.7–1.3)
ERYTHROCYTE [DISTWIDTH] IN BLOOD BY AUTOMATED COUNT: 12.3 % (ref 10–15)
FASTING STATUS PATIENT QL REPORTED: NO
GFR SERPL CREATININE-BSD FRML MDRD: >90 ML/MIN/1.73M2
GLUCOSE BLD-MCNC: 106 MG/DL (ref 70–125)
HBA1C MFR BLD: 5.1 %
HCT VFR BLD AUTO: 47.1 % (ref 40–53)
HDLC SERPL-MCNC: 36 MG/DL
HGB BLD-MCNC: 16.5 G/DL (ref 13.3–17.7)
LDLC SERPL CALC-MCNC: 111 MG/DL
MCH RBC QN AUTO: 29.8 PG (ref 26.5–33)
MCHC RBC AUTO-ENTMCNC: 35 G/DL (ref 31.5–36.5)
MCV RBC AUTO: 85 FL (ref 78–100)
PLATELET # BLD AUTO: 320 10E3/UL (ref 150–450)
POTASSIUM BLD-SCNC: 3.7 MMOL/L (ref 3.5–5)
PROT SERPL-MCNC: 7.5 G/DL (ref 6–8)
RBC # BLD AUTO: 5.54 10E6/UL (ref 4.4–5.9)
SODIUM SERPL-SCNC: 140 MMOL/L (ref 136–145)
TRIGL SERPL-MCNC: 93 MG/DL
TSH SERPL DL<=0.005 MIU/L-ACNC: 2.02 UIU/ML (ref 0.3–5)
WBC # BLD AUTO: 10.3 10E3/UL (ref 4–11)

## 2022-08-05 PROCEDURE — 83036 HEMOGLOBIN GLYCOSYLATED A1C: CPT | Performed by: INTERNAL MEDICINE

## 2022-08-05 PROCEDURE — 80061 LIPID PANEL: CPT | Performed by: INTERNAL MEDICINE

## 2022-08-05 PROCEDURE — 80053 COMPREHEN METABOLIC PANEL: CPT | Performed by: INTERNAL MEDICINE

## 2022-08-05 PROCEDURE — 82306 VITAMIN D 25 HYDROXY: CPT | Performed by: INTERNAL MEDICINE

## 2022-08-05 PROCEDURE — 36415 COLL VENOUS BLD VENIPUNCTURE: CPT | Performed by: INTERNAL MEDICINE

## 2022-08-05 PROCEDURE — 84443 ASSAY THYROID STIM HORMONE: CPT | Performed by: INTERNAL MEDICINE

## 2022-08-05 PROCEDURE — 85027 COMPLETE CBC AUTOMATED: CPT | Performed by: INTERNAL MEDICINE

## 2022-08-05 PROCEDURE — 82040 ASSAY OF SERUM ALBUMIN: CPT | Performed by: INTERNAL MEDICINE

## 2022-08-08 LAB — DEPRECATED CALCIDIOL+CALCIFEROL SERPL-MC: 13 UG/L (ref 20–75)

## 2022-08-18 ENCOUNTER — VIRTUAL VISIT (OUTPATIENT)
Dept: ENDOCRINOLOGY | Facility: CLINIC | Age: 22
End: 2022-08-18
Payer: COMMERCIAL

## 2022-08-18 VITALS — BODY MASS INDEX: 41.75 KG/M2 | HEIGHT: 73 IN | WEIGHT: 315 LBS

## 2022-08-18 DIAGNOSIS — E66.01 OBESITY, CLASS III, BMI 40-49.9 (MORBID OBESITY) (H): ICD-10-CM

## 2022-08-18 DIAGNOSIS — F90.9 ATTENTION DEFICIT HYPERACTIVITY DISORDER (ADHD), UNSPECIFIED ADHD TYPE: ICD-10-CM

## 2022-08-18 PROCEDURE — 99214 OFFICE O/P EST MOD 30 MIN: CPT | Mod: 95 | Performed by: INTERNAL MEDICINE

## 2022-08-18 RX ORDER — LISDEXAMFETAMINE DIMESYLATE 40 MG/1
40 CAPSULE ORAL EVERY MORNING
Qty: 30 CAPSULE | Refills: 0 | Status: SHIPPED | OUTPATIENT
Start: 2022-10-18 | End: 2022-12-09

## 2022-08-18 RX ORDER — LISDEXAMFETAMINE DIMESYLATE 40 MG/1
40 CAPSULE ORAL EVERY MORNING
Qty: 30 CAPSULE | Refills: 0 | Status: SHIPPED | OUTPATIENT
Start: 2022-08-18 | End: 2022-12-06

## 2022-08-18 RX ORDER — LISDEXAMFETAMINE DIMESYLATE 40 MG/1
40 CAPSULE ORAL EVERY MORNING
Qty: 30 CAPSULE | Refills: 0 | Status: SHIPPED | OUTPATIENT
Start: 2022-09-18 | End: 2023-02-02

## 2022-08-18 ASSESSMENT — PAIN SCALES - GENERAL: PAINLEVEL: NO PAIN (0)

## 2022-08-18 NOTE — LETTER
Date:August 29, 2022      Provider requested that no letter be sent. Do not send.       [facility]

## 2022-08-18 NOTE — NURSING NOTE
"Chief Complaint   Patient presents with     RECHECK     Return [facility]       Vitals:    08/18/22 1250   Weight: (!) 158.8 kg (350 lb)   Height: 1.93 m (6' 4\")       Body mass index is 42.6 kg/m .          TRINA DUNCAN EMT    "

## 2022-08-18 NOTE — PATIENT INSTRUCTIONS
Thank you for getting your labs done!  -- your good cholesterol (HDL) was a little low (36)  -- and your vitamin D was low at 13, continue supplementation with what you purchased       Contrave: (follow this titration, but it is OK to increase the doses more slowly)   Week 1: Take 1 tablet by mouth every morning  Week 2: Take 1 tablet by mouth twice daily  Week 3: Take 2 tablet by mouth in the AM and 1 in the PM  Week 4 and beyond: take 2 tablets by mouth twice daily     Try to figure out a way to move for 15 minutes every day.     Dietician: visit to discuss planning food throughout the day    Call Adult weight management number: [phone].

## 2022-08-18 NOTE — PROGRESS NOTES
"Return Medical Weight Management Note  Ronny Hancock  MRN:  [id number]  :  2000  AMBER:  2022    Dear Juanjose Norris (Inactive),    I had the pleasure of seeing your patient Ronny Hancock for follow-up consultation.  He is a 21 year old male who I am continuing to see for treatment of obesity related to:       2020   I have the following health issues associated with obesity: None of the above   I have the following symptoms associated with obesity: None of the above     INTERVAL HISTORY:    CURRENT WEIGHT:   330 lbs 9.6 oz    Wt Readings from Last 4 Encounters:   22 150 kg (330 lb 9.6 oz)   21 145.2 kg (320 lb)   21 136.1 kg (300 lb)   20 144.9 kg (319 lb 8 oz) (>99 %, Z= 3.25)*     * Growth percentiles are based on [facility] (Boys, 2-20 Years) data.     Height:  6' .835\"  Body Mass Index:  Body mass index is 43.82 kg/m .  Vitals:  B/P: Data Unavailable, P: Data Unavailable, R: Data Unavailable     Diet Recall Review with Patient 2020   Do you typically eat breakfast? No   Do you typically eat lunch? Yes   Do you typically eat supper? Yes   Do you typically eat snacks? Yes   Do you like vegetables?  Yes   Do you drink water? Yes   How many glasses of juice do you drink in a typical day? 0   How many of glasses of milk do you drink in a typical day? 0   If you do drink milk, what type? N/A   How many 8oz glasses of sugar containing drinks such as Aguilar-Aid/sweet tea do you drink in a day? 0   How many cans/bottles of sugar pop/soda/tea/sports drinks do you drink in a day? 3   How many cans/bottles of diet pop/soda/tea or sports drink do you drink in a day? 3   How often do you have a drink of alcohol? Never       MEDICATIONS:   Current Outpatient Medications   Medication     fluconazole (DIFLUCAN) 150 MG tablet     ketoconazole (NIZORAL) 2 % external cream     lisdexamfetamine (VYVANSE) 30 MG capsule     lisdexamfetamine (VYVANSE) 40 MG capsule     [START ON 2022] " lisdexamfetamine (VYVANSE) 40 MG capsule     [START ON 10/18/2022] lisdexamfetamine (VYVANSE) 40 MG capsule     naltrexone-bupropion (CONTRAVE) 8-90 MG per 12 hr tablet     semaglutide (OZEMPIC) 2 MG/1.5ML SOPN pen     semaglutide (OZEMPIC) 2 MG/1.5ML SOPN pen     terbinafine (LAMISIL) 1 % external cream     No current facility-administered medications for this visit.       Weight Loss Medication History Reviewed With Patient 8/18/2022   Which weight loss medications are you currently taking on a regular basis?  None   If you are not taking a weight loss medication that was prescribed to you, please indicate why: It did not seem to be helping me   Are you having any side effects from the weight loss medication that we have prescribed you? -       LABS:  Hemoglobin A1C   Date Value Ref Range Status   08/05/2022 5.1 <=5.6 % Final     Comment:       Prediabetes: 5.7 to 6.4%        Diabetes:  >=6.5%     Patients with Hgb F >5%, total bilirubin >10.0 mg/dL, abnormal red cell turnover, severe renal or hepatic disease or malignancy should not have this A1C method used to diagnose or monitor diabetes.      Cholesterol   Date Value Ref Range Status   08/05/2022 166 <=199 mg/dL Final     TSH   Date Value Ref Range Status   08/05/2022 2.02 0.30 - 5.00 uIU/mL Final     Creatinine   Date Value Ref Range Status   08/05/2022 0.96 0.70 - 1.30 mg/dL Final     ALT   Date Value Ref Range Status   08/05/2022 33 0 - 45 U/L Final       ASSESSMENT:  Mr. Hancock is a 21 year old male with history of Class 3 obesity with current body mass index is 43.82 kg/m . and with current health consequences who presents for weight management follow-up consultation. Overall Ronny Hancock is doing very well.     The following diagnoses are relevant to Ronny Hancock's history of obesity:     PLAN:    Problem   Obesity, Class III, Bmi 40-49.9 (Morbid Obesity) (H)    Oct 2020: Restarting vyvanse, as this had helped with decreased binge eating as well as  ADHD. Starting metformin for help with weight, drawing labs. Is walking regularly (2-3 days/week).   - starting metformin  - restarting vyvanse   - continuing walking, starting weighing    Jan2021: taking vyvanse 20mg daily. Is still walking most days.  Does not know his current weight.Sleep is going well. School is going well. Is living at home currently because of Covid.   - will increase to vyvanse 30mg daily  - 2,000mg metformin     May 2021: He is not sure what his weight is. There is a scale in his house, but in someone else's room. Discussed getting a scale he feels comfortable using in his room. Will refill vyvanse and metformin. He is working on sleep hygiene and exercise  - follow-up 2 to 3 months  - labs     Dec 2021: Will continue vyvanse 30mg. Will also start semaglutide 0.25mg per week for at least 1 month. Then can increase to 0.5mg per week  - emphasized need to self-weigh    Aug 2022: Weight is up, weighed 330 today on parents' scale. He reflects on the fact that when he was weighing himself last year, it was on carpet and the weight varied by about 20 pounds up or down. Tried semaglutide last year, but would go too long without eating, and then eat a lot at once because starving all of a sudden. He notices that the vyvanse makes him not hungry at all for 7 to 8 hours.   Metformin: we will stop this (was not taking)  Semaglutide: we will stop this (was not taking)  Vyvanse: will continue this, 40mg  Bupropion: will start contrave, titrate dose slowly. He prefers contrave over the two components individually.   See pt instructions for additional details discussed.         No problem-specific Assessment & Plan notes found for this encounter.      Orders Placed This Encounter   Procedures     Nutrition Referral        With motivational interviewing, [place] took part in making the following plan:  Patient Instructions   Thank you for getting your labs done!  -- your good cholesterol (HDL) was a little  low (36)  -- and your vitamin D was low at 13, continue supplementation with what you purchased       Contrave: (follow this titration, but it is OK to increase the doses more slowly)   Week 1: Take 1 tablet by mouth every morning  Week 2: Take 1 tablet by mouth twice daily  Week 3: Take 2 tablet by mouth in the AM and 1 in the PM  Week 4 and beyond: take 2 tablets by mouth twice daily     Try to figure out a way to move for 15 minutes every day.     Dietician: visit to discuss planning food throughout the day    Call Adult weight management number: [phone].         FOLLOW-UP:    8 weeks.    30 minutes spent on the date of the encounter doing chart review, history and exam, documentation and further activities as noted above.    Thank you for including me in the care of your patient.  Please do not hesitate to call with questions or concerns.    Sincerely,    Jacqueline Hernandez MD MPH  Diplomate, American Board of Obesity Medicine, American Board of Internal Medicine, American Board of Pediatrics    Departments of Internal Medicine and Pediatrics  [facility]        [unfilled]     [place] is a 21 year old who is being evaluated via a billable video visit.      How would you like to obtain your AVS? MyChart  If the video visit is dropped, the invitation should be resent by: Text to cell phone: [phone]  Will anyone else be joining your video visit? No        Video-Visit Details    Video Start Time: 1:07 PM    Type of service:  Video Visit    Video End Time:1:31 PM    Originating Location (pt. Location): Home    Distant Location (provider location):  [facility] [place]     Platform used for Video Visit: [email or web address]

## 2022-08-18 NOTE — LETTER
"2022       RE: Ronny Hancock  [address] [address]     Dear Colleague,    Thank you for referring your patient, Ronny Hancock, to the [facility] [place] [facility]. Please see a copy of my visit note below.    Return Medical Weight Management Note  Ronny Hancock  MRN:  [id number]  :  2000  AMBER:  2022    Dear Juanjose Norris (Inactive),    I had the pleasure of seeing your patient Ronny Hancock for follow-up consultation.  He is a 21 year old male who I am continuing to see for treatment of obesity related to:       2020   I have the following health issues associated with obesity: None of the above   I have the following symptoms associated with obesity: None of the above     INTERVAL HISTORY:    CURRENT WEIGHT:   330 lbs 9.6 oz    Wt Readings from Last 4 Encounters:   22 150 kg (330 lb 9.6 oz)   21 145.2 kg (320 lb)   21 136.1 kg (300 lb)   20 144.9 kg (319 lb 8 oz) (>99 %, Z= 3.25)*     * Growth percentiles are based on CDC (Boys, 2-20 Years) data.     Height:  6' .835\"  Body Mass Index:  Body mass index is 43.82 kg/m .  Vitals:  B/P: Data Unavailable, P: Data Unavailable, R: Data Unavailable     Diet Recall Review with Patient 2020   Do you typically eat breakfast? No   Do you typically eat lunch? Yes   Do you typically eat supper? Yes   Do you typically eat snacks? Yes   Do you like vegetables?  Yes   Do you drink water? Yes   How many glasses of juice do you drink in a typical day? 0   How many of glasses of milk do you drink in a typical day? 0   If you do drink milk, what type? N/A   How many 8oz glasses of sugar containing drinks such as Aguilar-Aid/sweet tea do you drink in a day? 0   How many cans/bottles of sugar pop/soda/tea/sports drinks do you drink in a day? 3   How many cans/bottles of diet pop/soda/tea or sports drink do you drink in a day? 3   How " often do you have a drink of alcohol? Never       MEDICATIONS:   Current Outpatient Medications   Medication     fluconazole (DIFLUCAN) 150 MG tablet     ketoconazole (NIZORAL) 2 % external cream     lisdexamfetamine (VYVANSE) 30 MG capsule     lisdexamfetamine (VYVANSE) 40 MG capsule     [START ON 9/18/2022] lisdexamfetamine (VYVANSE) 40 MG capsule     [START ON 10/18/2022] lisdexamfetamine (VYVANSE) 40 MG capsule     naltrexone-bupropion (CONTRAVE) 8-90 MG per 12 hr tablet     semaglutide (OZEMPIC) 2 MG/1.5ML SOPN pen     semaglutide (OZEMPIC) 2 MG/1.5ML SOPN pen     terbinafine (LAMISIL) 1 % external cream     No current facility-administered medications for this visit.       Weight Loss Medication History Reviewed With Patient 8/18/2022   Which weight loss medications are you currently taking on a regular basis?  None   If you are not taking a weight loss medication that was prescribed to you, please indicate why: It did not seem to be helping me   Are you having any side effects from the weight loss medication that we have prescribed you? -       LABS:  Hemoglobin A1C   Date Value Ref Range Status   08/05/2022 5.1 <=5.6 % Final     Comment:       Prediabetes: 5.7 to 6.4%        Diabetes:  >=6.5%     Patients with Hgb F >5%, total bilirubin >10.0 mg/dL, abnormal red cell turnover, severe renal or hepatic disease or malignancy should not have this A1C method used to diagnose or monitor diabetes.      Cholesterol   Date Value Ref Range Status   08/05/2022 166 <=199 mg/dL Final     TSH   Date Value Ref Range Status   08/05/2022 2.02 0.30 - 5.00 uIU/mL Final     Creatinine   Date Value Ref Range Status   08/05/2022 0.96 0.70 - 1.30 mg/dL Final     ALT   Date Value Ref Range Status   08/05/2022 33 0 - 45 U/L Final       ASSESSMENT:  Mr. Hancock is a 21 year old male with history of Class 3 obesity with current body mass index is 43.82 kg/m . and with current health consequences who presents for weight management  follow-up consultation. Overall Ronny Hancock is doing very well.     The following diagnoses are relevant to Ronny Hancock's history of obesity:     PLAN:    Problem   Obesity, Class III, Bmi 40-49.9 (Morbid Obesity) (H)    Oct 2020: Restarting vyvanse, as this had helped with decreased binge eating as well as ADHD. Starting metformin for help with weight, drawing labs. Is walking regularly (2-3 days/week).   - starting metformin  - restarting vyvanse   - continuing walking, starting weighing    Jan2021: taking vyvanse 20mg daily. Is still walking most days.  Does not know his current weight.Sleep is going well. School is going well. Is living at home currently because of Covid.   - will increase to vyvanse 30mg daily  - 2,000mg metformin     May 2021: He is not sure what his weight is. There is a scale in his house, but in someone else's room. Discussed getting a scale he feels comfortable using in his room. Will refill vyvanse and metformin. He is working on sleep hygiene and exercise  - follow-up 2 to 3 months  - labs     Dec 2021: Will continue vyvanse 30mg. Will also start semaglutide 0.25mg per week for at least 1 month. Then can increase to 0.5mg per week  - emphasized need to self-weigh    Aug 2022: Weight is up, weighed 330 today on parents' scale. He reflects on the fact that when he was weighing himself last year, it was on carpet and the weight varied by about 20 pounds up or down. Tried semaglutide last year, but would go too long without eating, and then eat a lot at once because starving all of a sudden. He notices that the vyvanse makes him not hungry at all for 7 to 8 hours.   Metformin: we will stop this (was not taking)  Semaglutide: we will stop this (was not taking)  Vyvanse: will continue this, 40mg  Bupropion: will start contrave, titrate dose slowly. He prefers contrave over the two components individually.   See pt instructions for additional details discussed.         No problem-specific  Assessment & Plan notes found for this encounter.      Orders Placed This Encounter   Procedures     Nutrition Referral        With motivational interviewing, Ronny took part in making the following plan:  Patient Instructions   Thank you for getting your labs done!  -- your good cholesterol (HDL) was a little low (36)  -- and your vitamin D was low at 13, continue supplementation with what you purchased       Contrave: (follow this titration, but it is OK to increase the doses more slowly)   Week 1: Take 1 tablet by mouth every morning  Week 2: Take 1 tablet by mouth twice daily  Week 3: Take 2 tablet by mouth in the AM and 1 in the PM  Week 4 and beyond: take 2 tablets by mouth twice daily     Try to figure out a way to move for 15 minutes every day.     Dietician: visit to discuss planning food throughout the day    Call Adult weight management number: [phone].         FOLLOW-UP:    8 weeks.    30 minutes spent on the date of the encounter doing chart review, history and exam, documentation and further activities as noted above.    Thank you for including me in the care of your patient.  Please do not hesitate to call with questions or concerns.    Sincerely,    Jacqueline Hernandez MD MPH  Diplomate, American Board of Obesity Medicine, American Board of Internal Medicine, American Board of Pediatrics    Departments of Internal Medicine and Pediatrics  [facility]        [unfilled]     [place] is a 21 year old who is being evaluated via a billable video visit.      How would you like to obtain your AVS? MyChart  If the video visit is dropped, the invitation should be resent by: Text to cell phone: [phone]  Will anyone else be joining your video visit? No        Video-Visit Details    Video Start Time: 1:07 PM    Type of service:  Video Visit    Video End Time:1:31 PM    Originating Location (pt. Location): Home    Distant Location (provider location):  [facility]  [place] [facility] [place]     Platform used for Video Visit: Lisa      Again, thank you for allowing me to participate in the care of your patient.      Sincerely,    Jacqueline Hernandez MD

## 2022-08-23 ENCOUNTER — TELEPHONE (OUTPATIENT)
Dept: ENDOCRINOLOGY | Facility: CLINIC | Age: 22
End: 2022-08-23

## 2022-08-23 NOTE — TELEPHONE ENCOUNTER
Central Prior Authorization Team   Phone: [phone]      PA Initiation    Medication: Contrave-PA initiated  Insurance Company: [employer] - Phone [phone] Fax [phone]  Pharmacy Filling the Rx: [id number] [address] AT [facility]  Filling Pharmacy Phone: [phone]  Filling Pharmacy Fax:    Start Date: 8/23/2022

## 2022-08-23 NOTE — TELEPHONE ENCOUNTER
"Prior Authorization Retail Medication Request    Medication/Dose: Contrave    ICD code (if different than what is on RX):      Previously Tried and Failed: watching portions and calories, exercise    Rationale: Hypertension    Weight loss medication(s) are indicated due to patient having a BMI of at least 30 kg/m2     Estimated body mass index is 43.82 kg/m  as calculated from the following:    Height as of 8/18/22: 1.85 m (6' 0.84\").    Weight as of 8/18/22: 150 kg (330 lb 9.6 oz).    Initial Weight: 319 lbs 8 oz    Insurance Name:    Insurance ID:        Pharmacy Information (if different than what is on RX)  Name:  [id number] [facility] [address] AT [facility]  Phone:  [phone]    "

## 2022-08-24 NOTE — TELEPHONE ENCOUNTER
Prior Authorization Approval    Authorization Effective Date: 7/23/2022  Authorization Expiration Date: 2/23/2023  Medication: Contrave-PA approved  Approved Dose/Quantity:   Reference #: [id number]   Insurance Company: [employer] - Phone [phone] Fax [phone]  Expected CoPay:       CoPay Card Available:      Foundation Assistance Needed:    Which Pharmacy is filling the prescription (Not needed for infusion/clinic administered): [id number] [address] AT [facility] OF [facility]  Pharmacy Notified: Yes  Patient Notified: No

## 2022-08-25 ENCOUNTER — TELEPHONE (OUTPATIENT)
Dept: ENDOCRINOLOGY | Facility: CLINIC | Age: 22
End: 2022-08-25

## 2022-08-25 NOTE — TELEPHONE ENCOUNTER
Call patient to schedule follow up in the Weight Management Clinic with Dr Hernandez per provider last visit on 08/18/22 checkout comment dispositions. No answered. Left message with clinic number to call back to schedule. Send mychart.     Schedule follow up 2 months with Dr Hernandez.

## 2022-09-18 ENCOUNTER — HEALTH MAINTENANCE LETTER (OUTPATIENT)
Age: 22
End: 2022-09-18

## 2022-12-06 ENCOUNTER — MYC REFILL (OUTPATIENT)
Dept: ENDOCRINOLOGY | Facility: CLINIC | Age: 22
End: 2022-12-06

## 2022-12-06 DIAGNOSIS — F90.9 ATTENTION DEFICIT HYPERACTIVITY DISORDER (ADHD), UNSPECIFIED ADHD TYPE: ICD-10-CM

## 2022-12-09 DIAGNOSIS — F90.9 ATTENTION DEFICIT HYPERACTIVITY DISORDER (ADHD), UNSPECIFIED ADHD TYPE: ICD-10-CM

## 2022-12-09 RX ORDER — LISDEXAMFETAMINE DIMESYLATE 40 MG/1
40 CAPSULE ORAL EVERY MORNING
Qty: 30 CAPSULE | Refills: 0 | Status: SHIPPED | OUTPATIENT
Start: 2022-12-09 | End: 2023-02-02

## 2022-12-09 RX ORDER — LISDEXAMFETAMINE DIMESYLATE 40 MG/1
40 CAPSULE ORAL EVERY MORNING
Qty: 30 CAPSULE | Refills: 0 | Status: SHIPPED | OUTPATIENT
Start: 2023-01-06 | End: 2022-12-30

## 2022-12-28 ENCOUNTER — MYC MEDICAL ADVICE (OUTPATIENT)
Dept: ENDOCRINOLOGY | Facility: CLINIC | Age: 22
End: 2022-12-28

## 2022-12-28 DIAGNOSIS — E66.01 OBESITY, CLASS III, BMI 40-49.9 (MORBID OBESITY) (H): ICD-10-CM

## 2022-12-28 DIAGNOSIS — F90.9 ATTENTION DEFICIT HYPERACTIVITY DISORDER (ADHD), UNSPECIFIED ADHD TYPE: ICD-10-CM

## 2022-12-30 RX ORDER — LISDEXAMFETAMINE DIMESYLATE 40 MG/1
40 CAPSULE ORAL EVERY MORNING
Qty: 30 CAPSULE | Refills: 0 | Status: SHIPPED | OUTPATIENT
Start: 2023-01-06 | End: 2023-02-02

## 2023-01-06 ENCOUNTER — TELEPHONE (OUTPATIENT)
Dept: PHARMACY | Facility: CLINIC | Age: 23
End: 2023-01-06

## 2023-01-06 NOTE — TELEPHONE ENCOUNTER
Patient left  message stating a Dr. Hernandez had referred him to our program but did not specify exactly what kind of appointment he needs. Left  message asking patient to call me back to discuss how I can help him.

## 2023-02-02 ENCOUNTER — VIRTUAL VISIT (OUTPATIENT)
Dept: ENDOCRINOLOGY | Facility: CLINIC | Age: 23
End: 2023-02-02
Payer: COMMERCIAL

## 2023-02-02 VITALS — BODY MASS INDEX: 42.66 KG/M2 | WEIGHT: 315 LBS | HEIGHT: 72 IN

## 2023-02-02 DIAGNOSIS — E66.01 OBESITY, CLASS III, BMI 40-49.9 (MORBID OBESITY) (H): ICD-10-CM

## 2023-02-02 DIAGNOSIS — F90.9 ATTENTION DEFICIT HYPERACTIVITY DISORDER (ADHD), UNSPECIFIED ADHD TYPE: ICD-10-CM

## 2023-02-02 PROCEDURE — 99213 OFFICE O/P EST LOW 20 MIN: CPT | Mod: GT | Performed by: INTERNAL MEDICINE

## 2023-02-02 RX ORDER — LISDEXAMFETAMINE DIMESYLATE 50 MG/1
50 CAPSULE ORAL EVERY MORNING
Qty: 30 CAPSULE | Refills: 0 | Status: SHIPPED | OUTPATIENT
Start: 2023-04-02 | End: 2024-03-08

## 2023-02-02 RX ORDER — LISDEXAMFETAMINE DIMESYLATE 50 MG/1
50 CAPSULE ORAL EVERY MORNING
Qty: 30 CAPSULE | Refills: 0 | Status: SHIPPED | OUTPATIENT
Start: 2023-03-02 | End: 2023-12-21

## 2023-02-02 RX ORDER — LISDEXAMFETAMINE DIMESYLATE 50 MG/1
50 CAPSULE ORAL EVERY MORNING
Qty: 30 CAPSULE | Refills: 0 | Status: SHIPPED | OUTPATIENT
Start: 2023-02-02 | End: 2023-12-21

## 2023-02-02 ASSESSMENT — PAIN SCALES - GENERAL: PAINLEVEL: MILD PAIN (2)

## 2023-02-02 NOTE — LETTER
"2023       RE: Ronny Hancock  [address] [facility] [place] [address]     Dear Colleague,    Thank you for referring your patient, Ronny Hancock, to the [facility] [place] at [facility]. Please see a copy of my visit note below.    Return Medical Weight Management Note  Ronny Hancock  MRN:  [id number]  :  2000  AMBER:  2023    Dear Juanjose Norris (Inactive),    I had the pleasure of seeing your patient Ronny Hancock for follow-up consultation.  He is a 22 year old male who I am continuing to see for treatment of obesity related to:       2020   I have the following health issues associated with obesity: None of the above   I have the following symptoms associated with obesity: None of the above     INTERVAL HISTORY:    CURRENT WEIGHT:   330 lbs 0 oz    Wt Readings from Last 4 Encounters:   23 149.7 kg (330 lb)   22 150 kg (330 lb 9.6 oz)   21 145.2 kg (320 lb)   21 136.1 kg (300 lb)       Height:  6' 0\"  Body Mass Index:  Body mass index is 44.76 kg/m .  Vitals:  B/P: Data Unavailable, P: Data Unavailable, R: Data Unavailable     Diet Recall Review with Patient 2020   Do you typically eat breakfast? No   Do you typically eat lunch? Yes   Do you typically eat supper? Yes   Do you typically eat snacks? Yes   Do you like vegetables?  Yes   Do you drink water? Yes   How many glasses of juice do you drink in a typical day? 0   How many of glasses of milk do you drink in a typical day? 0   If you do drink milk, what type? N/A   How many 8oz glasses of sugar containing drinks such as Aguilar-Aid/sweet tea do you drink in a day? 0   How many cans/bottles of sugar pop/soda/tea/sports drinks do you drink in a day? 3   How many cans/bottles of diet pop/soda/tea or sports drink do you drink in a day? 3   How often do you have a drink of alcohol? Never       MEDICATIONS:   Current Outpatient " Medications   Medication     fluconazole (DIFLUCAN) 150 MG tablet     ketoconazole (NIZORAL) 2 % external cream     lisdexamfetamine (VYVANSE) 30 MG capsule     [START ON 4/2/2023] lisdexamfetamine (VYVANSE) 50 MG capsule     [START ON 3/2/2023] lisdexamfetamine (VYVANSE) 50 MG capsule     lisdexamfetamine (VYVANSE) 50 MG capsule     naltrexone-bupropion (CONTRAVE) 8-90 MG per 12 hr tablet     terbinafine (LAMISIL) 1 % external cream     No current facility-administered medications for this visit.       Weight Loss Medication History Reviewed With Patient 2/2/2023   Which weight loss medications are you currently taking on a regular basis?  Vyvanse   If you are not taking a weight loss medication that was prescribed to you, please indicate why: -   Are you having any side effects from the weight loss medication that we have prescribed you? No       LABS:  Hemoglobin A1C   Date Value Ref Range Status   08/05/2022 5.1 <=5.6 % Final     Comment:       Prediabetes: 5.7 to 6.4%        Diabetes:  >=6.5%     Patients with Hgb F >5%, total bilirubin >10.0 mg/dL, abnormal red cell turnover, severe renal or hepatic disease or malignancy should not have this A1C method used to diagnose or monitor diabetes.      Cholesterol   Date Value Ref Range Status   08/05/2022 166 <=199 mg/dL Final     TSH   Date Value Ref Range Status   08/05/2022 2.02 0.30 - 5.00 uIU/mL Final     Creatinine   Date Value Ref Range Status   08/05/2022 0.96 0.70 - 1.30 mg/dL Final     ALT   Date Value Ref Range Status   08/05/2022 33 0 - 45 U/L Final       ASSESSMENT:  Mr. Hancock is a 22 year old male with history of Class 3 obesity with current body mass index is 44.76 kg/m . and with current health consequences who presents for weight management follow-up consultation. Overall Ronny Hancock is doing better.     The following diagnoses are relevant to Ronny Hancock's history of obesity:     PLAN:    Problem   Obesity, Class III, Bmi 40-49.9 (Morbid  Obesity) (H)    Oct 2020: Restarting vyvanse, as this had helped with decreased binge eating as well as ADHD. Starting metformin for help with weight, drawing labs. Is walking regularly (2-3 days/week).   - starting metformin  - restarting vyvanse   - continuing walking, starting weighing    Jan2021: taking vyvanse 20mg daily. Is still walking most days.  Does not know his current weight.Sleep is going well. School is going well. Is living at home currently because of Covid.   - will increase to vyvanse 30mg daily  - 2,000mg metformin     May 2021: He is not sure what his weight is. There is a scale in his house, but in someone else's room. Discussed getting a scale he feels comfortable using in his room. Will refill vyvanse and metformin. He is working on sleep hygiene and exercise  - follow-up 2 to 3 months  - labs     Dec 2021: Will continue vyvanse 30mg. Will also start semaglutide 0.25mg per week for at least 1 month. Then can increase to 0.5mg per week  - emphasized need to self-weigh    Aug 2022: Weight is up, weighed 330 today on parents' scale. He reflects on the fact that when he was weighing himself last year, it was on carpet and the weight varied by about 20 pounds up or down. Tried semaglutide last year, but would go too long without eating, and then eat a lot at once because starving all of a sudden. He notices that the vyvanse makes him not hungry at all for 7 to 8 hours.   Metformin: we will stop this (was not taking)  Semaglutide: we will stop this (was not taking)  Vyvanse: will continue this, 40mg  Bupropion: will start contrave, titrate dose slowly. He prefers contrave over the two components individually.   See pt instructions for additional details discussed.     Jan 2023  He is back in school, now is living on campus. He gets in bed around 11pm- 12am, but doesn't fall asleep right away because of screens. We discussed sleep hygiene.   Lisdexamfetamine: Increased 30mg to 40mg, which helped his  ADHD. As far as his eating, he eats breakfast immediately right after. Then eats again at 3:00pm to 4:00pm.  - does snack; has [employer] membership   The GLP-1 RA worked so well that he felt like his appetite was too low that he didn't eat regularly through the day, then got so hungry that he ate a lot and had emesis.   - follow-up with nutrition about regular intake  - follow-up with psychiatry about ADHD and executive function, per patient desire for working on this. Also for how that relates to over-eating and binge eating  - could consider topiramate as well        No problem-specific Assessment & Plan notes found for this encounter.      Orders Placed This Encounter   Procedures     Adult Mental Health  Referral        With motivational interviewing, Dawson took part in making the following plan:  Patient Instructions   1) Follow-up with psychiatry about ADHD  2) Follow-up with a dietician about regular intake to avoid becoming over-hungry in the evening          FOLLOW-UP:    4 weeks.    20 minutes spent on the date of the encounter doing chart review, history and exam, documentation and further activities as noted above.    Thank you for including me in the care of your patient.  Please do not hesitate to call with questions or concerns.    Sincerely,    Jacqueline Hernandez MD MPH  Diplomate, American Board of Obesity Medicine, American Board of Internal Medicine, American Board of Pediatrics    Departments of Internal Medicine and Pediatrics  [facility]        [unfilled]       Dawson Santi is a 22 year old who is being evaluated via a billable video visit.      How would you like to obtain your AVS? MyChart  If the video visit is dropped, the invitation should be resent by: Text to cell phone: [phone]  Will anyone else be joining your video visit? No  If patient encounters technical issues they should call [phone]    During this virtual visit the patient  is located in [place], patient verifies this as the location during the entirety of this visit.     Video-Visit Details  Video Start Time: 3:25 PM    Type of service:  Video Visit    Video End Time:3:48    Originating Location (pt. Location): Home  Distant Location (provider location):  [facility] [place]  Platform used for Video Visit: Lisa Nunez, EMT-P 2/2/2023      2:20 PM        Again, thank you for allowing me to participate in the care of your patient.      Sincerely,    Jacqueline Hernandez MD

## 2023-02-02 NOTE — PROGRESS NOTES
"Return Medical Weight Management Note  Ronny Hancock  MRN:  [id number]  :  2000  AMBER:  2023    Dear Juanjose Norris (Inactive),    I had the pleasure of seeing your patient Ronny Hancock for follow-up consultation.  He is a 22 year old male who I am continuing to see for treatment of obesity related to:       2020   I have the following health issues associated with obesity: None of the above   I have the following symptoms associated with obesity: None of the above     INTERVAL HISTORY:    CURRENT WEIGHT:   330 lbs 0 oz    Wt Readings from Last 4 Encounters:   23 149.7 kg (330 lb)   22 150 kg (330 lb 9.6 oz)   21 145.2 kg (320 lb)   21 136.1 kg (300 lb)       Height:  6' 0\"  Body Mass Index:  Body mass index is 44.76 kg/m .  Vitals:  B/P: Data Unavailable, P: Data Unavailable, R: Data Unavailable     Diet Recall Review with Patient 2020   Do you typically eat breakfast? No   Do you typically eat lunch? Yes   Do you typically eat supper? Yes   Do you typically eat snacks? Yes   Do you like vegetables?  Yes   Do you drink water? Yes   How many glasses of juice do you drink in a typical day? 0   How many of glasses of milk do you drink in a typical day? 0   If you do drink milk, what type? N/A   How many 8oz glasses of sugar containing drinks such as Aguilar-Aid/sweet tea do you drink in a day? 0   How many cans/bottles of sugar pop/soda/tea/sports drinks do you drink in a day? 3   How many cans/bottles of diet pop/soda/tea or sports drink do you drink in a day? 3   How often do you have a drink of alcohol? Never       MEDICATIONS:   Current Outpatient Medications   Medication     fluconazole (DIFLUCAN) 150 MG tablet     ketoconazole (NIZORAL) 2 % external cream     lisdexamfetamine (VYVANSE) 30 MG capsule     [START ON 2023] lisdexamfetamine (VYVANSE) 50 MG capsule     [START ON 3/2/2023] lisdexamfetamine (VYVANSE) 50 MG capsule     lisdexamfetamine (VYVANSE) 50 MG " capsule     naltrexone-bupropion (CONTRAVE) 8-90 MG per 12 hr tablet     terbinafine (LAMISIL) 1 % external cream     No current facility-administered medications for this visit.       Weight Loss Medication History Reviewed With Patient 2/2/2023   Which weight loss medications are you currently taking on a regular basis?  Vyvanse   If you are not taking a weight loss medication that was prescribed to you, please indicate why: -   Are you having any side effects from the weight loss medication that we have prescribed you? No       LABS:  Hemoglobin A1C   Date Value Ref Range Status   08/05/2022 5.1 <=5.6 % Final     Comment:       Prediabetes: 5.7 to 6.4%        Diabetes:  >=6.5%     Patients with Hgb F >5%, total bilirubin >10.0 mg/dL, abnormal red cell turnover, severe renal or hepatic disease or malignancy should not have this A1C method used to diagnose or monitor diabetes.      Cholesterol   Date Value Ref Range Status   08/05/2022 166 <=199 mg/dL Final     TSH   Date Value Ref Range Status   08/05/2022 2.02 0.30 - 5.00 uIU/mL Final     Creatinine   Date Value Ref Range Status   08/05/2022 0.96 0.70 - 1.30 mg/dL Final     ALT   Date Value Ref Range Status   08/05/2022 33 0 - 45 U/L Final       ASSESSMENT:  Mr. Hancock is a 22 year old male with history of Class 3 obesity with current body mass index is 44.76 kg/m . and with current health consequences who presents for weight management follow-up consultation. Overall Ronny Hancock is doing better.     The following diagnoses are relevant to Ronny Hancock's history of obesity:     PLAN:    Problem   Obesity, Class III, Bmi 40-49.9 (Morbid Obesity) (H)    Oct 2020: Restarting vyvanse, as this had helped with decreased binge eating as well as ADHD. Starting metformin for help with weight, drawing labs. Is walking regularly (2-3 days/week).   - starting metformin  - restarting vyvanse   - continuing walking, starting weighing    Jan2021: taking vyvanse 20mg daily.  Is still walking most days.  Does not know his current weight.Sleep is going well. School is going well. Is living at home currently because of Covid.   - will increase to vyvanse 30mg daily  - 2,000mg metformin     May 2021: He is not sure what his weight is. There is a scale in his house, but in someone else's room. Discussed getting a scale he feels comfortable using in his room. Will refill vyvanse and metformin. He is working on sleep hygiene and exercise  - follow-up 2 to 3 months  - labs     Dec 2021: Will continue vyvanse 30mg. Will also start semaglutide 0.25mg per week for at least 1 month. Then can increase to 0.5mg per week  - emphasized need to self-weigh    Aug 2022: Weight is up, weighed 330 today on parents' scale. He reflects on the fact that when he was weighing himself last year, it was on carpet and the weight varied by about 20 pounds up or down. Tried semaglutide last year, but would go too long without eating, and then eat a lot at once because starving all of a sudden. He notices that the vyvanse makes him not hungry at all for 7 to 8 hours.   Metformin: we will stop this (was not taking)  Semaglutide: we will stop this (was not taking)  Vyvanse: will continue this, 40mg  Bupropion: will start contrave, titrate dose slowly. He prefers contrave over the two components individually.   See pt instructions for additional details discussed.     Jan 2023  He is back in school, now is living on campus. He gets in bed around 11pm- 12am, but doesn't fall asleep right away because of screens. We discussed sleep hygiene.   Lisdexamfetamine: Increased 30mg to 40mg, which helped his ADHD. As far as his eating, he eats breakfast immediately right after. Then eats again at 3:00pm to 4:00pm.  - does snack; has [employer] membership   The GLP-1 RA worked so well that he felt like his appetite was too low that he didn't eat regularly through the day, then got so hungry that he ate a lot and had emesis.   -  follow-up with nutrition about regular intake  - follow-up with psychiatry about ADHD and executive function, per patient desire for working on this. Also for how that relates to over-eating and binge eating  - could consider topiramate as well        No problem-specific Assessment & Plan notes found for this encounter.      Orders Placed This Encounter   Procedures     Adult Mental Health  Referral        With motivational interviewingRonny took part in making the following plan:  Patient Instructions   1) Follow-up with psychiatry about ADHD  2) Follow-up with a dietician about regular intake to avoid becoming over-hungry in the evening          FOLLOW-UP:    4 weeks.    20 minutes spent on the date of the encounter doing chart review, history and exam, documentation and further activities as noted above.    Thank you for including me in the care of your patient.  Please do not hesitate to call with questions or concerns.    Sincerely,    Jacqueline Hernandez MD MPH  Diplomate, American Board of Obesity Medicine, American Board of Internal Medicine, American Board of Pediatrics    Departments of Internal Medicine and Pediatrics  [facility]        [unfilled]       Ellijay White is a 22 year old who is being evaluated via a billable video visit.      How would you like to obtain your AVS? MyChart  If the video visit is dropped, the invitation should be resent by: Text to cell phone: [phone]  Will anyone else be joining your video visit? No  If patient encounters technical issues they should call [phone]    During this virtual visit the patient is located in [place], patient verifies this as the location during the entirety of this visit.     Video-Visit Details  Video Start Time: 3:25 PM    Type of service:  Video Visit    Video End Time:3:48    Originating Location (pt. Location): Home  Distant Location (provider location):  [facility]  Platform used for Video Visit: Lisa Nunez, EMT-P 2/2/2023      2:20 PM

## 2023-02-02 NOTE — LETTER
Date:February 6, 2023      Provider requested that no letter be sent. Do not send.       [facility]

## 2023-02-02 NOTE — NURSING NOTE
(   Chief Complaint   Patient presents with     Follow Up    )    ( Weight: 330 lb (pt reported) )  ( Height: 6' )  ( BMI (Calculated): 44.76 )  (   )  (   )  (   )  (   )  (   )  (   )    (   )  (   )  (   )  (   )  (   )  (   )  (   )    (   Patient Active Problem List   Diagnosis     Obesity, Class III, BMI 40-49.9 (morbid obesity) (H)    )  (   Current Outpatient Medications   Medication Sig Dispense Refill     fluconazole (DIFLUCAN) 150 MG tablet 1 tab(s)       ketoconazole (NIZORAL) 2 % external cream 1 roman       lisdexamfetamine (VYVANSE) 30 MG capsule Take 1 capsule (30 mg) by mouth every morning 30 capsule 0     lisdexamfetamine (VYVANSE) 40 MG capsule Take 1 capsule (40 mg) by mouth every morning 30 capsule 0     lisdexamfetamine (VYVANSE) 40 MG capsule Take 1 capsule (40 mg) by mouth every morning 30 capsule 0     lisdexamfetamine (VYVANSE) 40 MG capsule Take 1 capsule (40 mg) by mouth every morning 30 capsule 0     naltrexone-bupropion (CONTRAVE) 8-90 MG per 12 hr tablet Week 1: Take 1 tablet by mouth every morning Week 2: Take 1 tablet by mouth twice daily Week 3: Take 2 tablet by mouth in the AM and 1 in the PM Week 4 and beyond: take 2 tablets by mouth twice daily 120 tablet 3     semaglutide (OZEMPIC) 2 MG/1.5ML SOPN pen Inject 0.25 mg Subcutaneous once a week 1.5 mL 1     semaglutide (OZEMPIC) 2 MG/1.5ML SOPN pen Inject 0.5 mg Subcutaneous once a week 1.5 mL 1     terbinafine (LAMISIL) 1 % external cream 1 roman      )  ( Diabetes Eval:    )    ( Pain Eval:  Mild Pain (2) )    ( Wound Eval:       )    (   History   Smoking Status     Never   Smokeless Tobacco     Never    )    ( Signed By:  VANESSA Tsai; February 2, 2023; 2:31 PM )

## 2023-02-06 ENCOUNTER — TELEPHONE (OUTPATIENT)
Dept: ENDOCRINOLOGY | Facility: CLINIC | Age: 23
End: 2023-02-06
Payer: COMMERCIAL

## 2023-02-06 NOTE — PATIENT INSTRUCTIONS
1) Follow-up with psychiatry about ADHD  2) Follow-up with a dietician about regular intake to avoid becoming over-hungry in the evening

## 2023-02-06 NOTE — TELEPHONE ENCOUNTER
JADE and sent [employer] for scheduling return in about 4 weeks (around 3/2/2023) with Dr Hernandez. Pt could also see Robyn Culver for sooner availability.

## 2023-02-13 ENCOUNTER — TELEPHONE (OUTPATIENT)
Dept: ENDOCRINOLOGY | Facility: CLINIC | Age: 23
End: 2023-02-13
Payer: COMMERCIAL

## 2023-02-13 NOTE — TELEPHONE ENCOUNTER
JADE and sent mychart for scheduling   - follow-up with Dr. Hernandez at next available, ideally in person     - follow-up with ANKIT

## 2023-03-25 ENCOUNTER — MYC MEDICAL ADVICE (OUTPATIENT)
Dept: ENDOCRINOLOGY | Facility: CLINIC | Age: 23
End: 2023-03-25
Payer: COMMERCIAL

## 2023-03-25 DIAGNOSIS — F90.9 ATTENTION DEFICIT HYPERACTIVITY DISORDER (ADHD), UNSPECIFIED ADHD TYPE: ICD-10-CM

## 2023-03-25 DIAGNOSIS — E66.01 OBESITY, CLASS III, BMI 40-49.9 (MORBID OBESITY) (H): Primary | ICD-10-CM

## 2023-03-27 RX ORDER — LISDEXAMFETAMINE DIMESYLATE 50 MG/1
50 CAPSULE ORAL EVERY MORNING
Qty: 90 CAPSULE | Refills: 0 | Status: SHIPPED | OUTPATIENT
Start: 2023-03-27 | End: 2023-09-04

## 2023-03-29 ENCOUNTER — TELEPHONE (OUTPATIENT)
Dept: ENDOCRINOLOGY | Facility: CLINIC | Age: 23
End: 2023-03-29
Payer: COMMERCIAL

## 2023-03-29 NOTE — TELEPHONE ENCOUNTER
Prior Authorization Retail Medication Request    Medication/Dose: Vyvanse  ICD code (if different than what is on RX):    Obesity, Class III, BMI 40-49.9 (morbid obesity) (H) [E66.01]       Attention deficit hyperactivity disorder (ADHD), unspecified ADHD type [F90.9]           Previously Tried and Failed:  Metformin, Semaglutide, diet, exercise    Rationale:     Body mass index is 44.76 kg/m .  Current weight 330 lbs 0 oz    Restarting Vyvanse, as this had helped with decreased binge eating as well as ADHD.     Insurance Name:    Insurance ID:        Pharmacy Information (if different than what is on RX)  Name: [id number] [facility] [address] AT [facility]  Phone:  [phone]

## 2023-03-31 NOTE — TELEPHONE ENCOUNTER
I called pharmacy to get insurance and they state they have a paid claim from 3/26 and patient has already picked this up. It does not need a PA.

## 2023-04-22 ENCOUNTER — MYC MEDICAL ADVICE (OUTPATIENT)
Dept: ENDOCRINOLOGY | Facility: CLINIC | Age: 23
End: 2023-04-22
Payer: COMMERCIAL

## 2023-06-01 ENCOUNTER — TELEPHONE (OUTPATIENT)
Dept: PEDIATRICS | Facility: CLINIC | Age: 23
End: 2023-06-01
Payer: COMMERCIAL

## 2023-06-04 ENCOUNTER — HEALTH MAINTENANCE LETTER (OUTPATIENT)
Age: 23
End: 2023-06-04

## 2023-06-05 ENCOUNTER — TELEPHONE (OUTPATIENT)
Dept: ENDOCRINOLOGY | Facility: CLINIC | Age: 23
End: 2023-06-05
Payer: COMMERCIAL

## 2023-06-05 NOTE — TELEPHONE ENCOUNTER
Called phamacy to inquire if they were really needing a P/A this time around. Spoke to pharmacist, who tried processing the rx again while on th phone with me and YES a P/A is needed now through [email or web address], verified ID#. I did have pharmacy try processing the other plan on patient's file in Epic and that didn't process either (mismatched ID/no coverage). So will work on urgent P/A through [email or web address].        PA Initiation    Medication: VYVANSE 50 MG PO CAPS  Insurance Company: [employer] - Phone [phone] Fax [phone]  Pharmacy [facility]illing the Rx[id number] [address] AT [facility]  Filling Pharmacy Phone: [phone]  Filling Pharmacy Fax:    Start Date: 6/5/2023

## 2023-06-05 NOTE — TELEPHONE ENCOUNTER
Prior Authorization Approval          Medication: VYVANSE 50 MG PO CAPS  Authorization Effective Date: 5/6/2023  Authorization Expiration Date: 6/4/2024  Approved Dose/Quantity: 30 per 30 days  Reference #: [id number]   Insurance Company: [employer] - Phone [phone] Fax [phone]  Expected CoPay: $1009.62  CoPay Card Available:      Financial Assistance Needed:   Which Pharmacy is filling the prescription: [facility] #[id number] [facility] [address] AT [facility]  Pharmacy Notified: Yes - per pharmacy the copay now comes back as $1009.62 (deductible to meet).   Patient Notified: No

## 2023-06-05 NOTE — TELEPHONE ENCOUNTER
Central Prior Authorization Team   Phone: [phone]    Tried processing to patient's [employer] [place] plan and get the message that the medication P/A needs to be processed to the patient's primary plan. Which is the [employer] that we got P/A approval for with a high copay due to deductible. See encounter from 06/01/2023.

## 2023-09-04 ENCOUNTER — MYC REFILL (OUTPATIENT)
Dept: ENDOCRINOLOGY | Facility: CLINIC | Age: 23
End: 2023-09-04
Payer: COMMERCIAL

## 2023-09-04 DIAGNOSIS — F90.9 ATTENTION DEFICIT HYPERACTIVITY DISORDER (ADHD), UNSPECIFIED ADHD TYPE: ICD-10-CM

## 2023-09-05 ENCOUNTER — MYC REFILL (OUTPATIENT)
Dept: ENDOCRINOLOGY | Facility: CLINIC | Age: 23
End: 2023-09-05
Payer: COMMERCIAL

## 2023-09-05 ENCOUNTER — TELEPHONE (OUTPATIENT)
Dept: PEDIATRICS | Facility: CLINIC | Age: 23
End: 2023-09-05
Payer: COMMERCIAL

## 2023-09-05 DIAGNOSIS — F90.9 ATTENTION DEFICIT HYPERACTIVITY DISORDER (ADHD), UNSPECIFIED ADHD TYPE: ICD-10-CM

## 2023-09-05 NOTE — TELEPHONE ENCOUNTER
General Call    Contacts         Type Contact Phone/Fax    09/05/2023 02:56 PM CDT Phone (Incoming) Ronny Hancock (Self) [phone] (H)          Reason for Call:  Medication question    What are your questions or concerns:  pt is completely out of Vyvance and would like to check on his refill status    Could we send this information to you in [place] or would you prefer to receive a phone call?:   Patient would prefer a phone call   Okay to leave a detailed message?: Yes at Cell number on file:    Telephone Information:   Mobile [phone]

## 2023-09-07 ENCOUNTER — MYC REFILL (OUTPATIENT)
Dept: ENDOCRINOLOGY | Facility: CLINIC | Age: 23
End: 2023-09-07
Payer: COMMERCIAL

## 2023-09-07 DIAGNOSIS — F90.9 ATTENTION DEFICIT HYPERACTIVITY DISORDER (ADHD), UNSPECIFIED ADHD TYPE: ICD-10-CM

## 2023-09-07 RX ORDER — LISDEXAMFETAMINE DIMESYLATE 50 MG/1
50 CAPSULE ORAL EVERY MORNING
Qty: 90 CAPSULE | Refills: 0 | Status: SHIPPED | OUTPATIENT
Start: 2023-09-07 | End: 2024-03-08

## 2023-09-07 RX ORDER — LISDEXAMFETAMINE DIMESYLATE 50 MG/1
50 CAPSULE ORAL EVERY MORNING
Qty: 90 CAPSULE | Refills: 0 | Status: SHIPPED | OUTPATIENT
Start: 2023-09-07 | End: 2023-12-21

## 2023-09-07 RX ORDER — LISDEXAMFETAMINE DIMESYLATE 50 MG/1
50 CAPSULE ORAL EVERY MORNING
Qty: 90 CAPSULE | Refills: 0 | Status: SHIPPED | OUTPATIENT
Start: 2023-09-07 | End: 2023-09-21

## 2023-09-21 ENCOUNTER — MYC REFILL (OUTPATIENT)
Dept: ENDOCRINOLOGY | Facility: CLINIC | Age: 23
End: 2023-09-21
Payer: COMMERCIAL

## 2023-09-21 DIAGNOSIS — F90.9 ATTENTION DEFICIT HYPERACTIVITY DISORDER (ADHD), UNSPECIFIED ADHD TYPE: ICD-10-CM

## 2023-09-22 RX ORDER — LISDEXAMFETAMINE DIMESYLATE 50 MG/1
50 CAPSULE ORAL EVERY MORNING
Qty: 90 CAPSULE | Refills: 0 | Status: SHIPPED | OUTPATIENT
Start: 2023-09-22 | End: 2024-03-08

## 2023-12-21 ENCOUNTER — TELEPHONE (OUTPATIENT)
Dept: PEDIATRICS | Facility: CLINIC | Age: 23
End: 2023-12-21

## 2023-12-21 ENCOUNTER — OFFICE VISIT (OUTPATIENT)
Dept: ENDOCRINOLOGY | Facility: CLINIC | Age: 23
End: 2023-12-21
Payer: COMMERCIAL

## 2023-12-21 VITALS
HEIGHT: 72 IN | DIASTOLIC BLOOD PRESSURE: 80 MMHG | HEART RATE: 76 BPM | WEIGHT: 315 LBS | OXYGEN SATURATION: 97 % | SYSTOLIC BLOOD PRESSURE: 119 MMHG | BODY MASS INDEX: 42.66 KG/M2

## 2023-12-21 DIAGNOSIS — F90.9 ATTENTION DEFICIT HYPERACTIVITY DISORDER (ADHD), UNSPECIFIED ADHD TYPE: ICD-10-CM

## 2023-12-21 DIAGNOSIS — E66.01 OBESITY, CLASS III, BMI 40-49.9 (MORBID OBESITY) (H): Primary | ICD-10-CM

## 2023-12-21 PROCEDURE — 99214 OFFICE O/P EST MOD 30 MIN: CPT | Performed by: INTERNAL MEDICINE

## 2023-12-21 RX ORDER — LISDEXAMFETAMINE DIMESYLATE 50 MG/1
50 CAPSULE ORAL EVERY MORNING
Qty: 30 CAPSULE | Refills: 0 | Status: SHIPPED | OUTPATIENT
Start: 2024-02-15 | End: 2024-07-24

## 2023-12-21 RX ORDER — LISDEXAMFETAMINE DIMESYLATE 50 MG/1
50 CAPSULE ORAL EVERY MORNING
Qty: 30 CAPSULE | Refills: 0 | Status: SHIPPED | OUTPATIENT
Start: 2023-12-21 | End: 2024-04-30

## 2023-12-21 RX ORDER — LISDEXAMFETAMINE DIMESYLATE 50 MG/1
50 CAPSULE ORAL EVERY MORNING
Qty: 90 CAPSULE | Refills: 0 | Status: SHIPPED | OUTPATIENT
Start: 2024-01-18 | End: 2024-07-24

## 2023-12-21 ASSESSMENT — PAIN SCALES - GENERAL: PAINLEVEL: NO PAIN (0)

## 2023-12-21 NOTE — LETTER
"2023       RE: Ronny Hancock  [address] [facility] [address]     Dear Colleague,    Thank you for referring your patient, Ronny Hancock, to the [facility] [place] at [facility]. Please see a copy of my visit note below.    Return Medical Weight Management Note  Ronny Hancock  MRN:  [id number]  :  2000  AMBER:  2023    Dear Juanjose Norris (Inactive),    I had the pleasure of seeing your patient Ronny Hancock for follow-up consultation.  He is a 23 year old male who I am continuing to see for treatment of obesity related to:        2020    10:49 AM   --   I have the following health issues associated with obesity None of the above   I have the following symptoms associated with obesity None of the above     INTERVAL HISTORY:    CURRENT WEIGHT:   344 lbs 4.8 oz    Wt Readings from Last 4 Encounters:   23 (!) 156.2 kg (344 lb 4.8 oz)   23 149.7 kg (330 lb)   22 150 kg (330 lb 9.6 oz)   21 145.2 kg (320 lb)     Height:  6' 0\"  Body Mass Index:  Body mass index is 46.7 kg/m .  Vitals:  B/P: 119/80, P: 76, R: Data Unavailable         2020    10:49 AM   Diet Recall Review with Patient   Do you typically eat breakfast? No   Do you typically eat lunch? Yes   Do you typically eat supper? Yes   Do you typically eat snacks? Yes   Do you like vegetables?  Yes   Do you drink water? Yes   How many glasses of juice do you drink in a typical day? 0   How many of glasses of milk do you drink in a typical day? 0   If you do drink milk, what type? N/A   How many 8oz glasses of sugar containing drinks such as Aguilar-Aid/sweet tea do you drink in a day? 0   How many cans/bottles of sugar pop/soda/tea/sports drinks do you drink in a day? 3   How many cans/bottles of diet pop/soda/tea or sports drink do you drink in a day? 3   How often do you have a drink of alcohol? Never     MEDICATIONS:   Current " Outpatient Medications   Medication    lisdexamfetamine (VYVANSE) 30 MG capsule    lisdexamfetamine (VYVANSE) 50 MG capsule    [START ON 2/15/2024] lisdexamfetamine (VYVANSE) 50 MG capsule    [START ON 1/18/2024] lisdexamfetamine (VYVANSE) 50 MG capsule    Semaglutide-Weight Management (WEGOVY) 0.25 MG/0.5ML pen    [START ON 1/18/2024] Semaglutide-Weight Management (WEGOVY) 0.5 MG/0.5ML pen    [START ON 2/15/2024] Semaglutide-Weight Management (WEGOVY) 1 MG/0.5ML pen    fluconazole (DIFLUCAN) 150 MG tablet    ketoconazole (NIZORAL) 2 % external cream    lisdexamfetamine (VYVANSE) 50 MG capsule    lisdexamfetamine (VYVANSE) 50 MG capsule    lisdexamfetamine (VYVANSE) 50 MG capsule    terbinafine (LAMISIL) 1 % external cream     No current facility-administered medications for this visit.         2/2/2023     2:32 PM   Weight Loss Medication History Reviewed With Patient   Which weight loss medications are you currently taking on a regular basis? Vyvanse     LABS:  Hemoglobin A1C   Date Value Ref Range Status   08/05/2022 5.1 <=5.6 % Final     Comment:       Prediabetes: 5.7 to 6.4%        Diabetes:  >=6.5%     Patients with Hgb F >5%, total bilirubin >10.0 mg/dL, abnormal red cell turnover, severe renal or hepatic disease or malignancy should not have this A1C method used to diagnose or monitor diabetes.      Cholesterol   Date Value Ref Range Status   08/05/2022 166 <=199 mg/dL Final     TSH   Date Value Ref Range Status   08/05/2022 2.02 0.30 - 5.00 uIU/mL Final     Creatinine   Date Value Ref Range Status   08/05/2022 0.96 0.70 - 1.30 mg/dL Final     ALT   Date Value Ref Range Status   08/05/2022 33 0 - 45 U/L Final       ASSESSMENT:  Mr. Hancock is a 23 year old male with history of Class 3 obesity with current body mass index is 46.7 kg/m . and with current health consequences who presents for weight management follow-up consultation. Overall [place] appears to need more medication management.     The  following diagnoses are relevant to Ronny Hancock's history of obesity:     PLAN:    Problem   Obesity, Class III, Bmi 40-49.9 (Morbid Obesity) (H)    Oct 2020: Restarting vyvanse, as this had helped with decreased binge eating as well as ADHD. Starting metformin for help with weight, drawing labs. Is walking regularly (2-3 days/week).   - starting metformin  - restarting vyvanse   - continuing walking, starting weighing    Jan2021: taking vyvanse 20mg daily. Is still walking most days.  Does not know his current weight.Sleep is going well. School is going well. Is living at home currently because of Covid.   - will increase to vyvanse 30mg daily  - 2,000mg metformin     May 2021: He is not sure what his weight is. There is a scale in his house, but in someone else's room. Discussed getting a scale he feels comfortable using in his room. Will refill vyvanse and metformin. He is working on sleep hygiene and exercise  - follow-up 2 to 3 months  - labs     Dec 2021: Will continue vyvanse 30mg. Will also start semaglutide 0.25mg per week for at least 1 month. Then can increase to 0.5mg per week  - emphasized need to self-weigh    Aug 2022: Weight is up, weighed 330 today on parents' scale. He reflects on the fact that when he was weighing himself last year, it was on carpet and the weight varied by about 20 pounds up or down. Tried semaglutide last year, but would go too long without eating, and then eat a lot at once because starving all of a sudden. He notices that the vyvanse makes him not hungry at all for 7 to 8 hours.   Metformin: we will stop this (was not taking)  Semaglutide: we will stop this (was not taking)  Vyvanse: will continue this, 40mg  Bupropion: will start contrave, titrate dose slowly. He prefers contrave over the two components individually.   See pt instructions for additional details discussed.     Jan 2023  He is back in school, now is living on campus. He gets in bed around 11pm- 12am, but  doesn't fall asleep right away because of screens. We discussed sleep hygiene.   Lisdexamfetamine: Increased 30mg to 40mg, which helped his ADHD. As far as his eating, he eats breakfast immediately right after. Then eats again at 3:00pm to 4:00pm.  - does snack; has [employer] membership   The GLP-1 RA worked so well that he felt like his appetite was too low that he didn't eat regularly through the day, then got so hungry that he ate a lot and had emesis.   - follow-up with nutrition about regular intake  - follow-up with psychiatry about ADHD and executive function, per patient desire for working on this. Also for how that relates to over-eating and binge eating  - could consider topiramate as well    Dec 2023: Is here in person, which is great for aligning his home scale with the clinic scale values. Is living at home now between semesters. Uses [employer] for food. Discussed not buying large amounts of prepared/packaged foods. He points out that his family members have responded well to GLP-1 receptor agonism. We will prescribe semaglutide or tirzepatide, will start whichever is covered by insurance and available.         No problem-specific Assessment & Plan notes found for this encounter.      Orders Placed This Encounter   Procedures    Adult Mental Health  Referral        With motivational interviewing, [place] took part in making the following plan:  Patient Instructions   I recommend not using [employer] for groceries.    See a dietician in the next few weeks (Adult weight management number: [phone].)    See psychiatry about ADHD management (continue vyvanse for now). We could start bupropion to help with depression and ADHD    Start 'ozempic,' 0.25mg per week then 0.5mg per week then 1.0mg per week    Follow-up in person soon (2 months)    Get vitamin D gummies or pills (about 2,000mg per day)     Try to prioritize movement during your break from school. Even just 5 to 10 minutes of walking outside is  valuable.         FOLLOW-UP:    12 weeks.    30 minutes spent on the date of the encounter doing chart review, history and exam, documentation and further activities as noted above.    Thank you for including me in the care of your patient.  Please do not hesitate to call with questions or concerns.    Sincerely,    Jacqueline Hernandez MD MPH  Diplomate, American Board of Obesity Medicine, American Board of Internal Medicine, American Board of Pediatrics    Departments of Internal Medicine and Pediatrics  [facility]        [unfilled]

## 2023-12-21 NOTE — NURSING NOTE
(   Chief Complaint   Patient presents with    RECHECK     Return MW    )    ( Weight: (!) 156.2 kg (344 lb 4.8 oz) )  ( Height: 182.9 cm (6') )  ( BMI (Calculated): 46.69 )  (   )  ( Cumulative weight loss (lbs): -24.8 )  ( Last Visits Weight: 149.7 kg (330 lb) )  ( Wt change since last visit (lbs): 14.3 )  (   )  (   )    ( BP: 119/80 )  (   )  (   )  (   )  ( Pulse: 76 )  (   )  ( SpO2: 97 % )    (   Patient Active Problem List   Diagnosis    Obesity, Class III, BMI 40-49.9 (morbid obesity) (H)    )  (   Current Outpatient Medications   Medication Sig Dispense Refill    lisdexamfetamine (VYVANSE) 30 MG capsule Take 1 capsule (30 mg) by mouth every morning 30 capsule 0    fluconazole (DIFLUCAN) 150 MG tablet 1 tab(s) (Patient not taking: Reported on 12/21/2023)      ketoconazole (NIZORAL) 2 % external cream 1 roman (Patient not taking: Reported on 12/21/2023)      lisdexamfetamine (VYVANSE) 50 MG capsule Take 1 capsule (50 mg) by mouth every morning (Patient not taking: Reported on 12/21/2023) 90 capsule 0    lisdexamfetamine (VYVANSE) 50 MG capsule Take 1 capsule (50 mg) by mouth every morning (Patient not taking: Reported on 12/21/2023) 90 capsule 0    lisdexamfetamine (VYVANSE) 50 MG capsule Take 1 capsule (50 mg) by mouth every morning (Patient not taking: Reported on 12/21/2023) 90 capsule 0    lisdexamfetamine (VYVANSE) 50 MG capsule Take 1 capsule (50 mg) by mouth every morning (Patient not taking: Reported on 12/21/2023) 30 capsule 0    lisdexamfetamine (VYVANSE) 50 MG capsule Take 1 capsule (50 mg) by mouth every morning (Patient not taking: Reported on 12/21/2023) 30 capsule 0    lisdexamfetamine (VYVANSE) 50 MG capsule Take 1 capsule (50 mg) by mouth every morning (Patient not taking: Reported on 12/21/2023) 30 capsule 0    naltrexone-bupropion (CONTRAVE) 8-90 MG per 12 hr tablet Week 1: Take 1 tablet by mouth every morning Week 2: Take 1 tablet by mouth twice daily Week 3: Take 2 tablet by mouth in the  AM and 1 in the PM Week 4 and beyond: take 2 tablets by mouth twice daily (Patient not taking: Reported on 12/21/2023) 120 tablet 3    terbinafine (LAMISIL) 1 % external cream 1 roman (Patient not taking: Reported on 12/21/2023)      )  ( Diabetes Eval:    )    ( Pain Eval:  No Pain (0) )    ( Wound Eval:       )    (   History   Smoking Status    Never   Smokeless Tobacco    Never    )    ( Signed By:  Andrez Valderrama, EMT; December 21, 2023; 4:27 PM )

## 2023-12-21 NOTE — TELEPHONE ENCOUNTER
PA Initiation    Medication: WEGOVY 0.25 MG/0.5ML SC SOAJ  Insurance Company: [employer] ([employer]) - Phone [phone] Fax [phone]  Pharmacy Filling the Rx:    Filling Pharmacy Phone:    Filling Pharmacy Fax:    Start Date: 12/21/2023    [id number]

## 2023-12-21 NOTE — PROGRESS NOTES
"Return Medical Weight Management Note  Ronny Hancock  MRN:  [id number]  :  2000  AMBER:  2023    Dear Juanjose Norris (Inactive),    I had the pleasure of seeing your patient Ronny Hancock for follow-up consultation.  He is a 23 year old male who I am continuing to see for treatment of obesity related to:        2020    10:49 AM   --   I have the following health issues associated with obesity None of the above   I have the following symptoms associated with obesity None of the above     INTERVAL HISTORY:    CURRENT WEIGHT:   344 lbs 4.8 oz    Wt Readings from Last 4 Encounters:   23 (!) 156.2 kg (344 lb 4.8 oz)   23 149.7 kg (330 lb)   22 150 kg (330 lb 9.6 oz)   21 145.2 kg (320 lb)     Height:  6' 0\"  Body Mass Index:  Body mass index is 46.7 kg/m .  Vitals:  B/P: 119/80, P: 76, R: Data Unavailable         2020    10:49 AM   Diet Recall Review with Patient   Do you typically eat breakfast? No   Do you typically eat lunch? Yes   Do you typically eat supper? Yes   Do you typically eat snacks? Yes   Do you like vegetables?  Yes   Do you drink water? Yes   How many glasses of juice do you drink in a typical day? 0   How many of glasses of milk do you drink in a typical day? 0   If you do drink milk, what type? N/A   How many 8oz glasses of sugar containing drinks such as Aguilar-Aid/sweet tea do you drink in a day? 0   How many cans/bottles of sugar pop/soda/tea/sports drinks do you drink in a day? 3   How many cans/bottles of diet pop/soda/tea or sports drink do you drink in a day? 3   How often do you have a drink of alcohol? Never     MEDICATIONS:   Current Outpatient Medications   Medication    lisdexamfetamine (VYVANSE) 30 MG capsule    lisdexamfetamine (VYVANSE) 50 MG capsule    [START ON 2/15/2024] lisdexamfetamine (VYVANSE) 50 MG capsule    [START ON 2024] lisdexamfetamine (VYVANSE) 50 MG capsule    Semaglutide-Weight Management (WEGOVY) 0.25 MG/0.5ML pen    " [START ON 1/18/2024] Semaglutide-Weight Management (WEGOVY) 0.5 MG/0.5ML pen    [START ON 2/15/2024] Semaglutide-Weight Management (WEGOVY) 1 MG/0.5ML pen    fluconazole (DIFLUCAN) 150 MG tablet    ketoconazole (NIZORAL) 2 % external cream    lisdexamfetamine (VYVANSE) 50 MG capsule    lisdexamfetamine (VYVANSE) 50 MG capsule    lisdexamfetamine (VYVANSE) 50 MG capsule    terbinafine (LAMISIL) 1 % external cream     No current facility-administered medications for this visit.         2/2/2023     2:32 PM   Weight Loss Medication History Reviewed With Patient   Which weight loss medications are you currently taking on a regular basis? Vyvanse     LABS:  Hemoglobin A1C   Date Value Ref Range Status   08/05/2022 5.1 <=5.6 % Final     Comment:       Prediabetes: 5.7 to 6.4%        Diabetes:  >=6.5%     Patients with Hgb F >5%, total bilirubin >10.0 mg/dL, abnormal red cell turnover, severe renal or hepatic disease or malignancy should not have this A1C method used to diagnose or monitor diabetes.      Cholesterol   Date Value Ref Range Status   08/05/2022 166 <=199 mg/dL Final     TSH   Date Value Ref Range Status   08/05/2022 2.02 0.30 - 5.00 uIU/mL Final     Creatinine   Date Value Ref Range Status   08/05/2022 0.96 0.70 - 1.30 mg/dL Final     ALT   Date Value Ref Range Status   08/05/2022 33 0 - 45 U/L Final       ASSESSMENT:  Mr. Hancock is a 23 year old male with history of Class 3 obesity with current body mass index is 46.7 kg/m . and with current health consequences who presents for weight management follow-up consultation. Overall Ronny Hancock appears to need more medication management.     The following diagnoses are relevant to Ronny Hancock's history of obesity:     PLAN:    Problem   Obesity, Class III, Bmi 40-49.9 (Morbid Obesity) (H)    Oct 2020: Restarting vyvanse, as this had helped with decreased binge eating as well as ADHD. Starting metformin for help with weight, drawing labs. Is walking regularly  (2-3 days/week).   - starting metformin  - restarting vyvanse   - continuing walking, starting weighing    Jan2021: taking vyvanse 20mg daily. Is still walking most days.  Does not know his current weight.Sleep is going well. School is going well. Is living at home currently because of Covid.   - will increase to vyvanse 30mg daily  - 2,000mg metformin     May 2021: He is not sure what his weight is. There is a scale in his house, but in someone else's room. Discussed getting a scale he feels comfortable using in his room. Will refill vyvanse and metformin. He is working on sleep hygiene and exercise  - follow-up 2 to 3 months  - labs     Dec 2021: Will continue vyvanse 30mg. Will also start semaglutide 0.25mg per week for at least 1 month. Then can increase to 0.5mg per week  - emphasized need to self-weigh    Aug 2022: Weight is up, weighed 330 today on parents' scale. He reflects on the fact that when he was weighing himself last year, it was on carpet and the weight varied by about 20 pounds up or down. Tried semaglutide last year, but would go too long without eating, and then eat a lot at once because starving all of a sudden. He notices that the vyvanse makes him not hungry at all for 7 to 8 hours.   Metformin: we will stop this (was not taking)  Semaglutide: we will stop this (was not taking)  Vyvanse: will continue this, 40mg  Bupropion: will start contrave, titrate dose slowly. He prefers contrave over the two components individually.   See pt instructions for additional details discussed.     Jan 2023  He is back in school, now is living on campus. He gets in bed around 11pm- 12am, but doesn't fall asleep right away because of screens. We discussed sleep hygiene.   Lisdexamfetamine: Increased 30mg to 40mg, which helped his ADHD. As far as his eating, he eats breakfast immediately right after. Then eats again at 3:00pm to 4:00pm.  - does snack; has [employer] membership   The GLP-1 RA worked so well that he  felt like his appetite was too low that he didn't eat regularly through the day, then got so hungry that he ate a lot and had emesis.   - follow-up with nutrition about regular intake  - follow-up with psychiatry about ADHD and executive function, per patient desire for working on this. Also for how that relates to over-eating and binge eating  - could consider topiramate as well    Dec 2023: Is here in person, which is great for aligning his home scale with the clinic scale values. Is living at home now between semesters. Uses [employer] for food. Discussed not buying large amounts of prepared/packaged foods. He points out that his family members have responded well to GLP-1 receptor agonism. We will prescribe semaglutide or tirzepatide, will start whichever is covered by insurance and available.         No problem-specific Assessment & Plan notes found for this encounter.      Orders Placed This Encounter   Procedures    Adult Mental Health  Referral        With motivational interviewing, [place] took part in making the following plan:  Patient Instructions   I recommend not using [employer] for groceries.    See a dietician in the next few weeks (Adult weight management number: [phone].)    See psychiatry about ADHD management (continue vyvanse for now). We could start bupropion to help with depression and ADHD    Start 'ozempic,' 0.25mg per week then 0.5mg per week then 1.0mg per week    Follow-up in person soon (2 months)    Get vitamin D gummies or pills (about 2,000mg per day)     Try to prioritize movement during your break from school. Even just 5 to 10 minutes of walking outside is valuable.         FOLLOW-UP:    12 weeks.    30 minutes spent on the date of the encounter doing chart review, history and exam, documentation and further activities as noted above.    Thank you for including me in the care of your patient.  Please do not hesitate to call with questions or  concerns.    Sincerely,    Jacqueline Hernandez MD MPH  Diplomate, American Board of Obesity Medicine, American Board of Internal Medicine, American Board of Pediatrics    Departments of Internal Medicine and [facility]s  [facility]        [unfilled]

## 2023-12-21 NOTE — PATIENT INSTRUCTIONS
I recommend not using [employer] for groceries.    See a dietician in the next few weeks (Adult weight management number: [phone].)    See psychiatry about ADHD management (continue vyvanse for now). We could start bupropion to help with depression and ADHD    Start 'ozempic,' 0.25mg per week then 0.5mg per week then 1.0mg per week    Follow-up in person soon (2 months)    Get vitamin D gummies or pills (about 2,000mg per day)     Try to prioritize movement during your break from school. Even just 5 to 10 minutes of walking outside is valuable.

## 2023-12-22 NOTE — TELEPHONE ENCOUNTER
PRIOR AUTHORIZATION DENIED    Medication: WEGOVY 0.25 MG/0.5ML SC SOAJ  Insurance Company: [employer] ([employer]) - Phone [phone] Fax [phone]  Denial Date: 12/21/2023  Denial Reason(s):   Appeal Information:   Patient Notified:

## 2023-12-28 NOTE — TELEPHONE ENCOUNTER
Medication Appeal Initiation    Medication: WEGOVY 0.25 MG/0.5ML SC SOAJ  Appeal Start Date:  12/28/2023  Insurance Company: [employer]   Insurance Phone: [phone]  Insurance Fax: [phone]  Comments:

## 2023-12-29 NOTE — TELEPHONE ENCOUNTER
MEDICATION APPEAL APPROVED    Medication: WEGOVY 0.25 MG/0.5ML SC SOAJ  Authorization Effective Date: 11/29/2023  Authorization Expiration Date: 7/26/2024  Approved Dose/Quantity: 2ml per 28 days  Reference #: [id number]   Appeal Insurance Company: [employer]  Expected CoPay: $ 263.16     CoPay Card Available: No  Financial Assistance Needed: no  Filling Pharmacy:    Patient Notified: yes  Comments:       Pt has a secondary insurance.  Will submit to see if secondary will cover copay

## 2024-01-02 PROBLEM — E66.01 OBESITY, CLASS III, BMI 40-49.9 (MORBID OBESITY) (H): Status: ACTIVE | Noted: 2020-10-30

## 2024-01-02 PROBLEM — E66.813 OBESITY, CLASS III, BMI 40-49.9 (MORBID OBESITY) (H): Status: ACTIVE | Noted: 2020-10-30

## 2024-02-29 ENCOUNTER — TELEPHONE (OUTPATIENT)
Dept: ENDOCRINOLOGY | Facility: CLINIC | Age: 24
End: 2024-02-29
Payer: COMMERCIAL

## 2024-02-29 NOTE — TELEPHONE ENCOUNTER
Left Voicemail (1st Attempt) and Sent Mychart (1st Attempt) to inform the patient of the following:    Appointment [facility]: [facility]  Provider: Isabel Mayogra  Return date: 3/4/24 appointment changed to virtual  Specialty phone number: [phone]  Additional appointment(s) needed: no  Additonal Notes: Isabel Mayorga will now be all virtual

## 2024-03-04 ENCOUNTER — VIRTUAL VISIT (OUTPATIENT)
Dept: ENDOCRINOLOGY | Facility: CLINIC | Age: 24
End: 2024-03-04
Payer: COMMERCIAL

## 2024-03-04 DIAGNOSIS — Z71.3 NUTRITIONAL COUNSELING: Primary | ICD-10-CM

## 2024-03-04 DIAGNOSIS — E66.01 OBESITY, CLASS III, BMI 40-49.9 (MORBID OBESITY) (H): ICD-10-CM

## 2024-03-04 PROCEDURE — 97802 MEDICAL NUTRITION INDIV IN: CPT | Mod: 95 | Performed by: DIETITIAN, REGISTERED

## 2024-03-04 PROCEDURE — 99207 PR NO CHARGE LOS: CPT | Mod: 95 | Performed by: DIETITIAN, REGISTERED

## 2024-03-04 NOTE — NURSING NOTE
Is the patient currently in the state of [place]? YES    Visit mode:VIDEO    If the visit is dropped, the patient can be reconnected by: VIDEO VISIT: Text to cell phone:   Telephone Information:   Mobile [phone]    and VIDEO VISIT: Send to e-mail at: [email or web address]    Will anyone else be joining the visit? NO  (If patient encounters technical issues they should call [phone])    How would you like to obtain your AVS? MyChart    Are changes needed to the allergy or medication list? No    Reason for visit: Consult    Shirley ARECHIGA

## 2024-03-04 NOTE — LETTER
3/4/2024       RE: Ronny Hancock  [address] [address]     Dear Colleague,    Thank you for referring your patient, Ronny Hancock, to the [facility] [place] [facility]. Please see a copy of my visit note below.    Video-Visit Details    Type of service:  Video Visit    Video Start Time: 2:05 pm   Video End Time: 2:27 pm    Originating Location (pt. Location): Other School    Distant Location (provider location):  Offsite (providers home)    Platform used for Video Visit: Lisa    New Weight Management Nutrition Consultation    Ronny Hancock is a 23 year old male presents today for new weight management nutrition consultation.  Patient referred by Dr. Hernandez on 23.     Did see Sheridan Simon RD for MWM 2020.    Patient with Co-morbidities of obesity includin/7/2020    10:49 AM   --   I have the following health issues associated with obesity None of the above   I have the following symptoms associated with obesity None of the above         Anthropometrics:  Weight at 2020 RD appointment: 319 lbs  Weight 23: 344 lbs     Estimated body mass index is 46.7 kg/m  as calculated from the following:    Height as of 23: 1.829 m (6').    Weight as of 23: 156.2 kg (344 lb 4.8 oz).    Current weight: no updated weight today    Medications for Weight Loss:  Wegovy prescribed Dec 2023 - Having issues getting per pt. Lots of insurance changes with his mom having lots of recent job changes per pt    NUTRITION HISTORY  Food allergies: NKFA  RD before: Yes, Saw Sheridan Simon RD 2020 per chart review. Does not recall much from this appointment.    Pt goals: not sure - hasn't thought about    Pt reports his ADHD meds (Vyvanse) suppress appetite then he reaches a point of extreme hunger. Hard to eat when no hunger. Tries to keep things such as protein bars on hand but did not pack anything  today.     Pt reports he has not had anything to eat yet today (2:10 pm). Went to vending machine to grab a snack during visit.    Has tried protein shakes before but was not a huge fan of them. Is open to something small in morning such as fruit and occ does this    Pt reports long term he is hoping to monitor/graph his blood sugars to get better data on highs/lows to help with timing of meals to prevent extremes.     Will eat a small meal/snack when he gets home from work/school.   Typically eats dinner with family. Example: stir howell     Hydration: no concerns per pt    Additional information:    Student       Family members (including mom) with hx of RYGB        2/7/2020    10:49 AM   Diet Recall Review with Patient   Do you typically eat breakfast? No   Do you typically eat lunch? Yes   Do you typically eat supper? Yes   Do you typically eat snacks? Yes   Do you like vegetables?  Yes   Do you drink water? Yes   How many glasses of juice do you drink in a typical day? 0   How many of glasses of milk do you drink in a typical day? 0   If you do drink milk, what type? N/A   How many 8oz glasses of sugar containing drinks such as Aguilar-Aid/sweet tea do you drink in a day? 0   How many cans/bottles of sugar pop/soda/tea/sports drinks do you drink in a day? 3   How many cans/bottles of diet pop/soda/tea or sports drink do you drink in a day? 3   How often do you have a drink of alcohol? Never           2/7/2020    10:49 AM   Eating Habits   Generally, my meals include foods like these bread, pasta, rice, potatoes, corn, crackers, sweet dessert, pop, or juice Almost Everyday   Generally, my meals include foods like these fried meats, brats, burgers, french fries, pizza, cheese, chips, or ice cream Almost Everyday   Eat fast food (like McDonalds, Burger Ruel, Taco Bell) A Few Times a Week   Eat at a buffet or sit-down restaurant Once a Week   Eat most of my meals in front of the TV or computer Everyday    Often skip meals, eat at random times, have no regular eating times Everyday   Rarely sit down for a meal but snack or graze throughout A Few Times a Week   Eat extra snacks between meals A Few Times a Week   Eat most of my food at the end of the day Almost Everyday   Eat in the middle of the night or wake up at night to eat Almost Everyday   Eat extra snacks to prevent or correct low blood sugar A Few Times a Week   Eat to prevent acid reflux or stomach pain Never   Worry about not having enough food to eat Never   Have you been to the food shelf at least a few times this year? No   I eat when I am depressed Never   I eat when I am stressed Never   I eat when I am bored Once a Week   I eat when I am anxious Never   I eat when I am happy or as a reward Never   I feel hungry all the time even if I just have eaten A Few Times a Week   Feeling full is important to me Never   I finish all the food on my plate even if I am already full Never   I can't resist eating delicious food or walk past the good food/smell Almost Everyday   I eat/snack without noticing that I am eating Never   I eat when I am preparing the meal A Few Times a Week   I eat more than usual when I see others eating Never   I have trouble not eating sweets, ice cream, cookies, or chips if they are around the house A Few Times a Week   I think about food all day Never   What foods, if any, do you crave? None           2/7/2020    10:49 AM   Amount of Food   I feel out of control when eating Never   I eat a large amount of food, like a loaf of bread, a box of cookies, a pint/quart of ice cream, all at once Almost Everyday   I eat a large amount of food even when I am not hungry Monthly   I eat rapidly Never   I eat alone because I feel embarrassed and do not want others to see how much I have eaten Never   I eat until I am uncomfortably full Never   I feel bad, disgusted, or guilty after I overeat Never           2/7/2020    10:49 AM   Activity/Exercise  History   How much of a typical 12 hour day do you spend sitting? Most of the Day   How much of a typical 12 hour day do you spend lying down? Less Than Half the Day   How much of a typical day do you spend walking/standing? Less Than Half the Day   How many hours (not including work) do you spend on the TV/Video Games/Computer/Tablet/Phone? 6 Hours or More   How many times a week are you active for the purpose of exercise? Once a Week   What keeps you from being more active? Lack of Time       Nutrition Prescription  Recommended energy/nutrient modification.    Nutrition Diagnosis    Obesity r/t long history of positive energy balance aeb BMI >30.    Nutrition Intervention  Reviewed current dietary habits and pts history   Answered pt questions  Coordination of care   Nutrition education   AVS and handouts via [employer]    Expected Engagement: fair-good    Nutrition Resources/Goals  Recommend not going to long without food to help prevent extreme hunger or overeating   When starting wegovy, may find benefit from eating more frequently throughout the day to help with nausea     Estimated recommended needs for weight loss  2 cups fruit/day  2.5 cups vegetables   12 oz protein  (1 oz = 1 egg, 1 Tbsp peanut butter, 1/4 cup cooked beans, peas, lentils)   11 servings grain (1 serving = 1 slice bread, 1/2 cup cooked rice/pasta/cereal   3 servings dairy (1 serving = 1 cup milk, 1 cup yogurt, 1.5 oz hard cheese, 1 cup cottage cheese, 1/3 cup shredded cheese)  Fat in moderation      Follow-Up:  PRN    Time spent with patient: 22 minutes.  Isabel Ochoa, JAMIE, RD, LD

## 2024-03-04 NOTE — PROGRESS NOTES
Video-Visit Details    Type of service:  Video Visit    Video Start Time: 2:05 pm   Video End Time: 2:27 pm    Originating Location (pt. Location): Other School    Distant Location (provider location):  Offsite (providers home)    Platform used for Video Visit: [employer]    New Weight Management Nutrition Consultation    Ronny Hancock is a 23 year old male presents today for new weight management nutrition consultation.  Patient referred by Dr. Hernandez on 23.     Did see Sheridan Simon RD for MWM 2020.    Patient with Co-morbidities of obesity includin/7/2020    10:49 AM   --   I have the following health issues associated with obesity None of the above   I have the following symptoms associated with obesity None of the above         Anthropometrics:  Weight at 2020 RD appointment: 319 lbs  Weight 23: 344 lbs     Estimated body mass index is 46.7 kg/m  as calculated from the following:    Height as of 23: 1.829 m (6').    Weight as of 23: 156.2 kg (344 lb 4.8 oz).    Current weight: no updated weight today    Medications for Weight Loss:  Wegovy prescribed Dec 2023 - Having issues getting per pt. Lots of insurance changes with his mom having lots of recent job changes per pt    NUTRITION HISTORY  Food allergies: NKFA  RD before: Yes, Saw Sheridan Simon RD 2020 per chart review. Does not recall much from this appointment.    Pt goals: not sure - hasn't thought about    Pt reports his ADHD meds (Vyvanse) suppress appetite then he reaches a point of extreme hunger. Hard to eat when no hunger. Tries to keep things such as protein bars on hand but did not pack anything today.     Pt reports he has not had anything to eat yet today (2:10 pm). Went to [employer] machine to grab a snack during visit.    Has tried protein shakes before but was not a huge fan of them. Is open to something small in morning such as fruit and occ does this    Pt reports long term he is hoping to  monitor/graph his blood sugars to get better data on highs/lows to help with timing of meals to prevent extremes.     Will eat a small meal/snack when he gets home from work/school.   Typically eats dinner with family. Example: stir howell     Hydration: no concerns per pt    Additional information:    Student       Family members (including mom) with hx of RYGB        2/7/2020    10:49 AM   Diet Recall Review with Patient   Do you typically eat breakfast? No   Do you typically eat lunch? Yes   Do you typically eat supper? Yes   Do you typically eat snacks? Yes   Do you like vegetables?  Yes   Do you drink water? Yes   How many glasses of juice do you drink in a typical day? 0   How many of glasses of milk do you drink in a typical day? 0   If you do drink milk, what type? N/A   How many 8oz glasses of sugar containing drinks such as Aguilar-Aid/sweet tea do you drink in a day? 0   How many cans/bottles of sugar pop/soda/tea/sports drinks do you drink in a day? 3   How many cans/bottles of diet pop/soda/tea or sports drink do you drink in a day? 3   How often do you have a drink of alcohol? Never           2/7/2020    10:49 AM   Eating Habits   Generally, my meals include foods like these bread, pasta, rice, potatoes, corn, crackers, sweet dessert, pop, or juice Almost Everyday   Generally, my meals include foods like these fried meats, brats, burgers, french fries, pizza, cheese, chips, or ice cream Almost Everyday   Eat fast food (like McDonalds, Burger Ruel, Taco Bell) A Few Times a Week   Eat at a buffet or sit-down restaurant Once a Week   Eat most of my meals in front of the TV or computer Everyday   Often skip meals, eat at random times, have no regular eating times Everyday   Rarely sit down for a meal but snack or graze throughout A Few Times a Week   Eat extra snacks between meals A Few Times a Week   Eat most of my food at the end of the day Almost Everyday   Eat in the middle of the night or  wake up at night to eat Almost Everyday   Eat extra snacks to prevent or correct low blood sugar A Few Times a Week   Eat to prevent acid reflux or stomach pain Never   Worry about not having enough food to eat Never   Have you been to the food shelf at least a few times this year? No   I eat when I am depressed Never   I eat when I am stressed Never   I eat when I am bored Once a Week   I eat when I am anxious Never   I eat when I am happy or as a reward Never   I feel hungry all the time even if I just have eaten A Few Times a Week   Feeling full is important to me Never   I finish all the food on my plate even if I am already full Never   I can't resist eating delicious food or walk past the good food/smell Almost Everyday   I eat/snack without noticing that I am eating Never   I eat when I am preparing the meal A Few Times a Week   I eat more than usual when I see others eating Never   I have trouble not eating sweets, ice cream, cookies, or chips if they are around the house A Few Times a Week   I think about food all day Never   What foods, if any, do you crave? None           2/7/2020    10:49 AM   Amount of Food   I feel out of control when eating Never   I eat a large amount of food, like a loaf of bread, a box of cookies, a pint/quart of ice cream, all at once Almost Everyday   I eat a large amount of food even when I am not hungry Monthly   I eat rapidly Never   I eat alone because I feel embarrassed and do not want others to see how much I have eaten Never   I eat until I am uncomfortably full Never   I feel bad, disgusted, or guilty after I overeat Never           2/7/2020    10:49 AM   Activity/Exercise History   How much of a typical 12 hour day do you spend sitting? Most of the Day   How much of a typical 12 hour day do you spend lying down? Less Than Half the Day   How much of a typical day do you spend walking/standing? Less Than Half the Day   How many hours (not including work) do you spend on  the TV/Video Games/Computer/Tablet/Phone? 6 Hours or More   How many times a week are you active for the purpose of exercise? Once a Week   What keeps you from being more active? Lack of Time       Nutrition Prescription  Recommended energy/nutrient modification.    Nutrition Diagnosis    Obesity r/t long history of positive energy balance aeb BMI >30.    Nutrition Intervention  Reviewed current dietary habits and pts history   Answered pt questions  Coordination of care   Nutrition education   AVS and handouts via [employer]    Expected Engagement: fair-good    Nutrition Resources/Goals  Recommend not going to long without food to help prevent extreme hunger or overeating   When starting wegovy, may find benefit from eating more frequently throughout the day to help with nausea     Estimated recommended needs for weight loss  2 cups fruit/day  2.5 cups vegetables   12 oz protein  (1 oz = 1 egg, 1 Tbsp peanut butter, 1/4 cup cooked beans, peas, lentils)   11 servings grain (1 serving = 1 slice bread, 1/2 cup cooked rice/pasta/cereal   3 servings dairy (1 serving = 1 cup milk, 1 cup yogurt, 1.5 oz hard cheese, 1 cup cottage cheese, 1/3 cup shredded cheese)  Fat in moderation      Follow-Up:  PRN    Time spent with patient: 22 minutes.  JAMIE Martinez, RD, LD

## 2024-03-04 NOTE — PATIENT INSTRUCTIONS
Nutrition Resources/Goals  Recommend not going to long without food to help prevent extreme hunger or overeating   When starting wegovy, may find benefit from eating more frequently throughout the day to help with nausea     Estimated recommended needs for weight loss  2 cups fruit/day  2.5 cups vegetables   12 oz protein  (1 oz = 1 egg, 1 Tbsp peanut butter, 1/4 cup cooked beans, peas, lentils)   11 servings grain (1 serving = 1 slice bread, 1/2 cup cooked rice/pasta/cereal   3 servings dairy (1 serving = 1 cup milk, 1 cup yogurt, 1.5 oz hard cheese, 1 cup cottage cheese, 1/3 cup shredded cheese)  Fat in moderation      Follow-Up:  CELESTE Mayorga (Duncan), JAMIE, RD, LD  Clinic #: [phone]

## 2024-03-06 ENCOUNTER — MYC MEDICAL ADVICE (OUTPATIENT)
Dept: ENDOCRINOLOGY | Facility: CLINIC | Age: 24
End: 2024-03-06
Payer: COMMERCIAL

## 2024-03-06 DIAGNOSIS — E66.01 OBESITY, CLASS III, BMI 40-49.9 (MORBID OBESITY) (H): Primary | ICD-10-CM

## 2024-03-06 DIAGNOSIS — F90.9 ATTENTION DEFICIT HYPERACTIVITY DISORDER (ADHD), UNSPECIFIED ADHD TYPE: ICD-10-CM

## 2024-03-08 ENCOUNTER — TELEPHONE (OUTPATIENT)
Dept: PEDIATRICS | Facility: CLINIC | Age: 24
End: 2024-03-08
Payer: COMMERCIAL

## 2024-03-08 RX ORDER — LISDEXAMFETAMINE DIMESYLATE 50 MG/1
50 CAPSULE ORAL EVERY MORNING
Qty: 30 CAPSULE | Refills: 0 | Status: SHIPPED | OUTPATIENT
Start: 2024-03-08

## 2024-03-08 RX ORDER — LISDEXAMFETAMINE DIMESYLATE 50 MG/1
50 CAPSULE ORAL EVERY MORNING
Qty: 90 CAPSULE | Refills: 0 | Status: SHIPPED | OUTPATIENT
Start: 2024-04-05

## 2024-03-08 RX ORDER — LISDEXAMFETAMINE DIMESYLATE 50 MG/1
50 CAPSULE ORAL EVERY MORNING
Qty: 90 CAPSULE | Refills: 0 | Status: SHIPPED | OUTPATIENT
Start: 2024-05-03

## 2024-03-08 NOTE — TELEPHONE ENCOUNTER
Reason for Call:  Other prescription    Detailed comments: Please clarify prescription for lisdexamfetamine (VYVANSE) 50 MG capsule.  Pharmacy received quantities of 30 & 90 with a fill date of 30 days.    Phone Number Patient can be reached at: Other phone number:  [phone]*    Best Time: any    Can we leave a detailed message on this number? YES      Call taken on 3/8/2024 at 10:50 AM by Itzel Lynn

## 2024-03-13 NOTE — TELEPHONE ENCOUNTER
Reason for Call:  Other prescription    Detailed comments: Pharmacy calling for clarification on Vyvanse. Rx for March was sent with 30 capsules, April & May for a quantity of 90. Please advise.    Phone Number Patient can be reached at: Other phone number:  [phone]*    Best Time: any    Can we leave a detailed message on this number? YES    Call taken on 3/13/2024 at 3:33 PM by Itzel Lynn

## 2024-03-14 ENCOUNTER — TELEPHONE (OUTPATIENT)
Dept: ENDOCRINOLOGY | Facility: CLINIC | Age: 24
End: 2024-03-14
Payer: COMMERCIAL

## 2024-03-14 NOTE — TELEPHONE ENCOUNTER
MD called pharmacy to clarify orders.     They said that he can  the next prescription on March 21st (because he picked up a 90-day supply in December).    The March prescription is 30 days, and then they also have prescriptions for a 90 day supply after you finish the 30 day supply that you  on March 21st.    They said this is not an issue and they can just keep both of those 90 day scripts and fill them when it's allowed based on his last fill (can fill 2 days early)    If they don't have the supply for a 90 day supply, they will decrease that prescription to 30 days.

## 2024-03-14 NOTE — TELEPHONE ENCOUNTER
Prior Authorization Retail Medication Request    Medication/Dose: Vyvanse  Diagnosis and ICD code (if different than what is on RX):    Obesity, Class III, BMI 40-49.9 (morbid obesity) (H) [E66.01]  - Primary      Attention deficit hyperactivity disorder (ADHD), unspecified ADHD type [F90.9]        New/renewal/insurance change PA/secondary ins. PA:  Previously Tried and Failed:  history of diet and exercise  Rationale:  I had the pleasure of seeing your patient Ronny Hancock for follow-up consultation.  He is a 23 year old male who I am continuing to see for treatment of obesity.        Insurance   Primary:   Insurance ID:      Secondary (if applicable):  Insurance ID:      Pharmacy Information (if different than what is on RX)  Name:    [id number] [address]     Phone:  [phone]   Fax:     [phone]

## 2024-03-26 DIAGNOSIS — E66.01 OBESITY, CLASS III, BMI 40-49.9 (MORBID OBESITY) (H): ICD-10-CM

## 2024-03-26 NOTE — TELEPHONE ENCOUNTER
Retail Pharmacy Prior Authorization Team   Phone: [phone]    Prior Authorization Approval    Medication: VYVANSE 50 MG PO CAPS  Authorization Effective Date: 3/26/2024  Authorization Expiration Date: 6/26/2024  Insurance Company: [employer] - Phone [phone] Fax [phone]  Which Pharmacy is filling the prescription: [id number] [place], [place] - [address]  Pharmacy Notified: YES  Patient Notified: YES **Instructed pharmacy to notify patient when script is ready to /ship.**

## 2024-03-26 NOTE — TELEPHONE ENCOUNTER
Retail Pharmacy Prior Authorization Team   Phone: [phone]    PA Initiation    Medication: VYVANSE 50 MG PO CAPS NAME BRAND DUE TO GENERIC SHORTAGE  Insurance Company: [employer] - Phone [phone] Fax [phone]  Pharmacy Filling the Rx: [id number] [place], [place] - [address]  Filling Pharmacy Phone: [phone]  Filling Pharmacy Fax:    Start Date: 3/26/2024    NO PA NEEDED FOR GENERIC. CALLED INS @[phone] FOR NAME BRAND APPROVAL.     Note: Due to record-high volumes, our turn-around time is taking longer than usual . We are currently 10 business days behind in the pools.   We are working diligently to submit all requests in a timely manner and in the order they are received. Please only flag TRUE URGENT requests as high priority to the pool at this time.   If you have questions - please send a note/message in the active PA encounter and send back to the RPPA (Retail Pharmacy Prior Authorization) team [[id number]].    If you have more specific questions about our process please reach out to our supervisor Chelsea Montiel.   Thank you!

## 2024-04-03 ENCOUNTER — MYC MEDICAL ADVICE (OUTPATIENT)
Dept: ENDOCRINOLOGY | Facility: CLINIC | Age: 24
End: 2024-04-03
Payer: COMMERCIAL

## 2024-04-03 DIAGNOSIS — E66.01 OBESITY, CLASS III, BMI 40-49.9 (MORBID OBESITY) (H): Primary | ICD-10-CM

## 2024-04-05 ENCOUNTER — TELEPHONE (OUTPATIENT)
Dept: PEDIATRICS | Facility: CLINIC | Age: 24
End: 2024-04-05
Payer: COMMERCIAL

## 2024-04-05 NOTE — TELEPHONE ENCOUNTER
[facility]    Fax: [phone]    Spec: [phone]    MO: [phone]

## 2024-04-08 NOTE — TELEPHONE ENCOUNTER
PA Initiation    Medication: ZEPBOUND 7.5 MG/0.5ML SC SOAJ  Insurance Company: [employer] [place] - Phone [phone] Fax [phone]  Pharmacy Filling the Rx:    Filling Pharmacy Phone:    Filling Pharmacy Fax:    Start Date: 4/8/2024    [id number]

## 2024-04-11 ENCOUNTER — MYC MEDICAL ADVICE (OUTPATIENT)
Dept: ENDOCRINOLOGY | Facility: CLINIC | Age: 24
End: 2024-04-11
Payer: COMMERCIAL

## 2024-04-11 DIAGNOSIS — F90.9 ATTENTION DEFICIT HYPERACTIVITY DISORDER (ADHD), UNSPECIFIED ADHD TYPE: ICD-10-CM

## 2024-04-11 DIAGNOSIS — E66.01 OBESITY, CLASS III, BMI 40-49.9 (MORBID OBESITY) (H): ICD-10-CM

## 2024-04-11 NOTE — TELEPHONE ENCOUNTER
PRIOR AUTHORIZATION DENIED    Medication: ZEPBOUND 7.5 MG/0.5ML SC SOAJ  Insurance Company: [employer] [place] - Phone [phone] Fax [phone]  Denial Date: 4/10/2024  Denial Reason(s):   Appeal Information:   Patient Notified:

## 2024-04-15 NOTE — TELEPHONE ENCOUNTER
Patient reports his current weight as 329.2lbs. Waiting for response back on when he started the medication.

## 2024-04-17 NOTE — TELEPHONE ENCOUNTER
Sounds good. If nothing else needed, please close encounter.    Thank You!    Sam Darden CP Pharmacy Liaison  [facility] [place] [email or web address]  Phone: [phone]  Fax: [phone]

## 2024-04-17 NOTE — TELEPHONE ENCOUNTER
Patient confirmed that his last injection of Zepbound was 2.5mg dose on 3/29. He has not taken 5mg dose. Therefore, PA for 7.5mg dose at this time is not needed. Requested that Dr. Hernandez resend refills for 2.5mg dose, as the others have not been taken and are not in stock.

## 2024-04-30 ENCOUNTER — TELEPHONE (OUTPATIENT)
Dept: PEDIATRICS | Facility: CLINIC | Age: 24
End: 2024-04-30
Payer: COMMERCIAL

## 2024-04-30 RX ORDER — LISDEXAMFETAMINE DIMESYLATE 50 MG/1
50 CAPSULE ORAL EVERY MORNING
Qty: 90 CAPSULE | Refills: 0 | Status: SHIPPED | OUTPATIENT
Start: 2024-04-30

## 2024-04-30 NOTE — TELEPHONE ENCOUNTER
PA Initiation    Medication: WEGOVY 0.25 MG/0.5ML SC SOAJ  Insurance Company: [employer] [place] - Phone [phone] Fax [phone]  Pharmacy Filling the Rx:    Filling Pharmacy Phone:    Filling Pharmacy Fax:    Start Date: 4/30/2024    [id number]

## 2024-05-01 NOTE — TELEPHONE ENCOUNTER
PRIOR AUTHORIZATION DENIED    Medication: WEGOVY 0.25 MG/0.5ML SC SOAJ  Insurance Company: [employer] [place] - Phone [phone] Fax [phone]  Denial Date: 5/1/2024  Denial Reason(s): Need documentation of patient's current BMI. Needs to be over 30kg/m2  Appeal Information: Once documentation has been received, fax to [phone]  Patient Notified:     I received a call from patient's insurance's PA department stating they have denied the PA because there's no updated document of the patient's current BMI. The BMI has to be over 30kg/m2. Please get patient's updated BMI and provide me with the chart note and I will fax it over to the PA Department.    Thank You!    Sam Darden [facility] Pharmacy Liaison  [facility]  [email or web address]  Phone: [phone]  Fax: [phone]

## 2024-05-01 NOTE — TELEPHONE ENCOUNTER
Awesome!    Thank You!    Sam Darden [facility] Pharmacy Liaison  [facility] [place]  [email or web address]  Phone: [phone]  Fax: [phone]

## 2024-05-02 ENCOUNTER — OFFICE VISIT (OUTPATIENT)
Dept: ENDOCRINOLOGY | Facility: CLINIC | Age: 24
End: 2024-05-02
Payer: COMMERCIAL

## 2024-05-02 VITALS
BODY MASS INDEX: 42.66 KG/M2 | OXYGEN SATURATION: 97 % | HEIGHT: 72 IN | SYSTOLIC BLOOD PRESSURE: 114 MMHG | DIASTOLIC BLOOD PRESSURE: 88 MMHG | WEIGHT: 315 LBS | HEART RATE: 89 BPM

## 2024-05-02 DIAGNOSIS — F90.9 ATTENTION DEFICIT HYPERACTIVITY DISORDER (ADHD), UNSPECIFIED ADHD TYPE: ICD-10-CM

## 2024-05-02 DIAGNOSIS — E66.01 OBESITY, CLASS III, BMI 40-49.9 (MORBID OBESITY) (H): ICD-10-CM

## 2024-05-02 PROCEDURE — 99213 OFFICE O/P EST LOW 20 MIN: CPT | Performed by: INTERNAL MEDICINE

## 2024-05-02 ASSESSMENT — PAIN SCALES - GENERAL: PAINLEVEL: NO PAIN (0)

## 2024-05-02 NOTE — TELEPHONE ENCOUNTER
I have faxed out the chart note from 05/02/2024 visit that shows patient's current BMI to [phone].    Thank You!    Sam Darden [facility] Pharmacy Liaison  [facility] [place] [email or web address]  Phone: [phone]  Fax: [phone]

## 2024-05-02 NOTE — PATIENT INSTRUCTIONS
"Wegovy is also once/week injection, similar to tirzepatide.     The fastest we can increase is once/month, but we want to adjust the dose increasing to you as an individual and how you are responding. Because if you are losing 1 pound/week, we don't need or want to increase the dose.     Wegovy: take 0.25mg per week for 4 weeks  Then take 0.5mg per week. Weigh at least once/week and if you are losing 1 pound/week, we can send refills for the 0.5mg dose.    But give us a week warning, or just fill the 1.0mg dose in the meantime    Sleep  When the body does not get enough sleep, it increases levels of cortisol and ghrelin.   Both of these hormones make it hard to lose weight, and even make us gain weight.   Removing screens from the bedroom is important for getting enough quality and quantity of sleep  It is important to not watch screens in bed or in the bedroom because the body makes strong space-sleep associations  We want the body to only associate sleeping with the bed, so that when the body gets into bed, it knows \"This is the space where I sleep. I know what do do here, I will just fall asleep.\"   But if the body is used to watching screens in bed, it will have a hard time turning off and falling asleep and staying asleep   You can get a white noise machine if you prefer to have some background noise on as you are falling asleep.   It is OK to read in bed with a low-intensity, soft light (not your phone light).     "

## 2024-05-02 NOTE — TELEPHONE ENCOUNTER
Vitals: /88 (BP Location: Left arm, Patient Position: Sitting, Cuff Size: Adult Large)     Pulse 89     Ht 1.829 m (6')     Wt 149.5 kg (329 lb 9.6 oz)     SpO2 97%     BMI 44.70 kg/m      BSA 2.76 m      Pain Sc No Pain (0)

## 2024-05-02 NOTE — LETTER
2024       RE: Ronny Hancock  [address] [facility] [address]     Dear Colleague,    Thank you for referring your patient, Ronny Hancock, to the [facility] [place] at [facility]. Please see a copy of my visit note below.      Return Medical Weight Management Note     Ronny Hancock  MRN:  [id number]  :  2000  AMBER:  2024    Dear Juanjose Norris MD (Inactive),    I had the pleasure of seeing your patient Ronny Hancock. He is a 23 year old male who I am continuing to see for treatment of obesity related to:        2020    10:49 AM   --   I have the following health issues associated with obesity None of the above   I have the following symptoms associated with obesity None of the above       CURRENT WEIGHT:   329 lbs 9.6 oz  Wt Readings from Last 4 Encounters:   24 149.5 kg (329 lb 9.6 oz)   23 (!) 156.2 kg (344 lb 4.8 oz)   23 149.7 kg (330 lb)   22 150 kg (330 lb 9.6 oz)                      MEDICATIONS:   Current Outpatient Medications   Medication Sig Dispense Refill    lisdexamfetamine (VYVANSE) 30 MG capsule Take 1 capsule (30 mg) by mouth every morning 30 capsule 0    lisdexamfetamine (VYVANSE) 50 MG capsule Take 1 capsule (50 mg) by mouth every morning 90 capsule 0    lisdexamfetamine (VYVANSE) 50 MG capsule Take 1 capsule (50 mg) by mouth every morning 90 capsule 0    lisdexamfetamine (VYVANSE) 50 MG capsule Take 1 capsule (50 mg) by mouth every morning 90 capsule 0    lisdexamfetamine (VYVANSE) 50 MG capsule Take 1 capsule (50 mg) by mouth every morning 30 capsule 0    lisdexamfetamine (VYVANSE) 50 MG capsule Take 1 capsule (50 mg) by mouth every morning 30 capsule 0    lisdexamfetamine (VYVANSE) 50 MG capsule Take 1 capsule (50 mg) by mouth every morning 90 capsule 0    [START ON 2024] lisdexamfetamine (VYVANSE) 60 MG capsule Take 1 capsule (60 mg) by mouth every  morning 90 capsule 0    Semaglutide-Weight Management (WEGOVY) 0.25 MG/0.5ML pen Inject 0.25 mg Subcutaneous once a week 2 mL 0    Semaglutide-Weight Management (WEGOVY) 0.5 MG/0.5ML pen Inject 0.5 mg Subcutaneous once a week 3 mL 0    Semaglutide-Weight Management (WEGOVY) 1 MG/0.5ML pen Inject 1 mg Subcutaneous once a week 2 mL 3    tirzepatide-Weight Management (ZEPBOUND) 2.5 MG/0.5ML prefilled pen Inject 0.5 mLs (2.5 mg) Subcutaneous every 7 days 2 mL 0    tirzepatide-Weight Management (ZEPBOUND) 5 MG/0.5ML prefilled pen Inject 0.5 mLs (5 mg) Subcutaneous every 7 days 2 mL 0    tirzepatide-Weight Management (ZEPBOUND) 7.5 MG/0.5ML prefilled pen Inject 0.5 mLs (7.5 mg) Subcutaneous every 7 days 4 mL 1    tirzepatide-Weight Management (ZEPBOUND) 7.5 MG/0.5ML prefilled pen Inject 0.5 mLs (7.5 mg) Subcutaneous every 7 days 2 mL 3    fluconazole (DIFLUCAN) 150 MG tablet 1 tab(s) (Patient not taking: Reported on 12/21/2023)      ketoconazole (NIZORAL) 2 % external cream 1 roman (Patient not taking: Reported on 12/21/2023)      terbinafine (LAMISIL) 1 % external cream 1 roman (Patient not taking: Reported on 12/21/2023)             5/2/2024     2:24 PM   Weight Loss Medication History Reviewed With Patient   Which weight loss medications are you currently taking on a regular basis? Vyvanse   If you are not taking a weight loss medication that was prescribed to you, please indicate why: Other   Are you having any side effects from the weight loss medication that we have prescribed you? No       PHYSICAL EXAM:  Objective   /88 (BP Location: Left arm, Patient Position: Sitting, Cuff Size: Adult Large)   Pulse 89   Ht 1.829 m (6')   Wt 149.5 kg (329 lb 9.6 oz)   SpO2 97%   BMI 44.70 kg/m      ASSESSMENT/PLAN:  Problem   Obesity, Class III, Bmi 40-49.9 (Morbid Obesity) (H)    Oct 2020: Restarting vyvanse, as this had helped with decreased binge eating as well as ADHD. Starting metformin for help with weight, drawing  labs. Is walking regularly (2-3 days/week).   - starting metformin  - restarting vyvanse   - continuing walking, starting weighing    Jan2021: taking vyvanse 20mg daily. Is still walking most days.  Does not know his current weight.Sleep is going well. School is going well. Is living at home currently because of Covid.   - will increase to vyvanse 30mg daily  - 2,000mg metformin     May 2021: He is not sure what his weight is. There is a scale in his house, but in someone else's room. Discussed getting a scale he feels comfortable using in his room. Will refill vyvanse and metformin. He is working on sleep hygiene and exercise  - follow-up 2 to 3 months  - labs     Dec 2021: Will continue vyvanse 30mg. Will also start semaglutide 0.25mg per week for at least 1 month. Then can increase to 0.5mg per week  - emphasized need to self-weigh    Aug 2022: Weight is up, weighed 330 today on parents' scale. He reflects on the fact that when he was weighing himself last year, it was on carpet and the weight varied by about 20 pounds up or down. Tried semaglutide last year, but would go too long without eating, and then eat a lot at once because starving all of a sudden. He notices that the vyvanse makes him not hungry at all for 7 to 8 hours.   Metformin: we will stop this (was not taking)  Semaglutide: we will stop this (was not taking)  Vyvanse: will continue this, 40mg  Bupropion: will start contrave, titrate dose slowly. He prefers contrave over the two components individually.   See pt instructions for additional details discussed.     Jan 2023  He is back in school, now is living on campus. He gets in bed around 11pm- 12am, but doesn't fall asleep right away because of screens. We discussed sleep hygiene.   Lisdexamfetamine: Increased 30mg to 40mg, which helped his ADHD. As far as his eating, he eats breakfast immediately right after. Then eats again at 3:00pm to 4:00pm.  - does snack; has [employer] membership   The GLP-1  RA worked so well that he felt like his appetite was too low that he didn't eat regularly through the day, then got so hungry that he ate a lot and had emesis.   - follow-up with nutrition about regular intake  - follow-up with psychiatry about ADHD and executive function, per patient desire for working on this. Also for how that relates to over-eating and binge eating  - could consider topiramate as well    Dec 2023: Is here in person, which is great for aligning his home scale with the clinic scale values. Is living at home now between semesters. Uses [employer] for food. Discussed not buying large amounts of prepared/packaged foods. He points out that his family members have responded well to GLP-1 receptor agonism. We will prescribe semaglutide or tirzepatide, will start whichever is covered by insurance and available.     May 2024. Seeing Mr Hancock in person. His weight is down since December because of tirzepatide. However, now tirzepatide is in short supply. We will order semaglutide. He is doing well with activity and sleep. Will continue vyvanse as well. Could consider increasing vyvanse to 60mg in the future.             Sincerely,    Jacqueline Hernandez MD      No LOS data to display   Time spent by me doing chart review, history and exam, documentation and further activities per the note    Jacqueline Hernandez MD

## 2024-05-02 NOTE — NURSING NOTE
(   Chief Complaint   Patient presents with    Follow Up    )    ( Weight: 329 lb 9.6 oz )  ( Height: 6' )  ( BMI (Calculated): 44.7 )  (   )  (   )  (   )  (   )  (   )  (   )    ( BP: 114/88 )  (   )  (   )  (   )  ( Pulse: 89 )  (   )  ( SpO2: 97 % )    (   Patient Active Problem List   Diagnosis    Obesity, Class III, BMI 40-49.9 (morbid obesity) (H)    )  (   Current Outpatient Medications   Medication Sig Dispense Refill    lisdexamfetamine (VYVANSE) 30 MG capsule Take 1 capsule (30 mg) by mouth every morning 30 capsule 0    lisdexamfetamine (VYVANSE) 50 MG capsule Take 1 capsule (50 mg) by mouth every morning 90 capsule 0    [START ON 5/3/2024] lisdexamfetamine (VYVANSE) 50 MG capsule Take 1 capsule (50 mg) by mouth every morning 90 capsule 0    lisdexamfetamine (VYVANSE) 50 MG capsule Take 1 capsule (50 mg) by mouth every morning 90 capsule 0    lisdexamfetamine (VYVANSE) 50 MG capsule Take 1 capsule (50 mg) by mouth every morning 30 capsule 0    lisdexamfetamine (VYVANSE) 50 MG capsule Take 1 capsule (50 mg) by mouth every morning 30 capsule 0    lisdexamfetamine (VYVANSE) 50 MG capsule Take 1 capsule (50 mg) by mouth every morning 90 capsule 0    Semaglutide-Weight Management (WEGOVY) 0.25 MG/0.5ML pen Inject 0.25 mg Subcutaneous once a week 2 mL 0    Semaglutide-Weight Management (WEGOVY) 0.5 MG/0.5ML pen Inject 0.5 mg Subcutaneous once a week 3 mL 0    Semaglutide-Weight Management (WEGOVY) 1 MG/0.5ML pen Inject 1 mg Subcutaneous once a week 2 mL 3    tirzepatide-Weight Management (ZEPBOUND) 2.5 MG/0.5ML prefilled pen Inject 0.5 mLs (2.5 mg) Subcutaneous every 7 days 2 mL 0    tirzepatide-Weight Management (ZEPBOUND) 5 MG/0.5ML prefilled pen Inject 0.5 mLs (5 mg) Subcutaneous every 7 days 2 mL 0    tirzepatide-Weight Management (ZEPBOUND) 7.5 MG/0.5ML prefilled pen Inject 0.5 mLs (7.5 mg) Subcutaneous every 7 days 4 mL 1    tirzepatide-Weight Management (ZEPBOUND) 7.5 MG/0.5ML prefilled pen Inject 0.5  mLs (7.5 mg) Subcutaneous every 7 days 2 mL 3    fluconazole (DIFLUCAN) 150 MG tablet 1 tab(s) (Patient not taking: Reported on 12/21/2023)      ketoconazole (NIZORAL) 2 % external cream 1 roman (Patient not taking: Reported on 12/21/2023)      terbinafine (LAMISIL) 1 % external cream 1 roman (Patient not taking: Reported on 12/21/2023)      )  ( Diabetes Eval:    )    ( Pain Eval:  No Pain (0) )    ( Wound Eval:       )    (   History   Smoking Status    Never   Smokeless Tobacco    Never    )    ( Signed By:  Enrike Nunez, EMT; May 2, 2024; 2:41 PM )

## 2024-05-03 NOTE — TELEPHONE ENCOUNTER
Prior Authorization Approval    Medication: WEGOVY 0.25 MG/0.5ML SC SOAJ  Authorization Effective Date: 5/3/2024  Authorization Expiration Date: 11/15/2024  Approved Dose/Quantity: 2ml per 28 days  Reference #: [id number]   Insurance Company: [employer] [place] - Phone [phone] Fax [phone]  Expected CoPay: $    CoPay Card Available: No    Financial Assistance Needed:   Which Pharmacy is filling the prescription:    Pharmacy Notified:   Patient Notified:     Got a call from [facility] that PA is approved they will fax the approval. Once approval's received I will attach to encounter.    Thank You!    Sam Darden [facility] Pharmacy Liaison  [facility]  [email or web address]  Phone: [phone]  Fax: [phone]

## 2024-05-24 RX ORDER — LISDEXAMFETAMINE DIMESYLATE 60 MG/1
60 CAPSULE ORAL EVERY MORNING
Qty: 90 CAPSULE | Refills: 0 | Status: SHIPPED | OUTPATIENT
Start: 2024-07-01 | End: 2024-07-22

## 2024-05-24 NOTE — PROGRESS NOTES
Return Medical Weight Management Note     Ronny Hancock  MRN:  [id number]  :  2000  AMBER:  2024    Dear Juanjose Norris MD (Inactive),    I had the pleasure of seeing your patient Ronny Hancock. He is a 23 year old male who I am continuing to see for treatment of obesity related to:        2020    10:49 AM   --   I have the following health issues associated with obesity None of the above   I have the following symptoms associated with obesity None of the above       CURRENT WEIGHT:   329 lbs 9.6 oz  Wt Readings from Last 4 Encounters:   24 149.5 kg (329 lb 9.6 oz)   23 (!) 156.2 kg (344 lb 4.8 oz)   23 149.7 kg (330 lb)   22 150 kg (330 lb 9.6 oz)                      MEDICATIONS:   Current Outpatient Medications   Medication Sig Dispense Refill    lisdexamfetamine (VYVANSE) 30 MG capsule Take 1 capsule (30 mg) by mouth every morning 30 capsule 0    lisdexamfetamine (VYVANSE) 50 MG capsule Take 1 capsule (50 mg) by mouth every morning 90 capsule 0    lisdexamfetamine (VYVANSE) 50 MG capsule Take 1 capsule (50 mg) by mouth every morning 90 capsule 0    lisdexamfetamine (VYVANSE) 50 MG capsule Take 1 capsule (50 mg) by mouth every morning 90 capsule 0    lisdexamfetamine (VYVANSE) 50 MG capsule Take 1 capsule (50 mg) by mouth every morning 30 capsule 0    lisdexamfetamine (VYVANSE) 50 MG capsule Take 1 capsule (50 mg) by mouth every morning 30 capsule 0    lisdexamfetamine (VYVANSE) 50 MG capsule Take 1 capsule (50 mg) by mouth every morning 90 capsule 0    [START ON 2024] lisdexamfetamine (VYVANSE) 60 MG capsule Take 1 capsule (60 mg) by mouth every morning 90 capsule 0    Semaglutide-Weight Management (WEGOVY) 0.25 MG/0.5ML pen Inject 0.25 mg Subcutaneous once a week 2 mL 0    Semaglutide-Weight Management (WEGOVY) 0.5 MG/0.5ML pen Inject 0.5 mg Subcutaneous once a week 3 mL 0    Semaglutide-Weight Management (WEGOVY) 1 MG/0.5ML pen Inject 1 mg Subcutaneous once a  week 2 mL 3    tirzepatide-Weight Management (ZEPBOUND) 2.5 MG/0.5ML prefilled pen Inject 0.5 mLs (2.5 mg) Subcutaneous every 7 days 2 mL 0    tirzepatide-Weight Management (ZEPBOUND) 5 MG/0.5ML prefilled pen Inject 0.5 mLs (5 mg) Subcutaneous every 7 days 2 mL 0    tirzepatide-Weight Management (ZEPBOUND) 7.5 MG/0.5ML prefilled pen Inject 0.5 mLs (7.5 mg) Subcutaneous every 7 days 4 mL 1    tirzepatide-Weight Management (ZEPBOUND) 7.5 MG/0.5ML prefilled pen Inject 0.5 mLs (7.5 mg) Subcutaneous every 7 days 2 mL 3    fluconazole (DIFLUCAN) 150 MG tablet 1 tab(s) (Patient not taking: Reported on 12/21/2023)      ketoconazole (NIZORAL) 2 % external cream 1 roman (Patient not taking: Reported on 12/21/2023)      terbinafine (LAMISIL) 1 % external cream 1 roman (Patient not taking: Reported on 12/21/2023)             5/2/2024     2:24 PM   Weight Loss Medication History Reviewed With Patient   Which weight loss medications are you currently taking on a regular basis? Vyvanse   If you are not taking a weight loss medication that was prescribed to you, please indicate why: Other   Are you having any side effects from the weight loss medication that we have prescribed you? No       PHYSICAL EXAM:  Objective    /88 (BP Location: Left arm, Patient Position: Sitting, Cuff Size: Adult Large)   Pulse 89   Ht 1.829 m (6')   Wt 149.5 kg (329 lb 9.6 oz)   SpO2 97%   BMI 44.70 kg/m      ASSESSMENT/PLAN:  Problem   Obesity, Class III, Bmi 40-49.9 (Morbid Obesity) (H)    Oct 2020: Restarting vyvanse, as this had helped with decreased binge eating as well as ADHD. Starting metformin for help with weight, drawing labs. Is walking regularly (2-3 days/week).   - starting metformin  - restarting vyvanse   - continuing walking, starting weighing    Jan2021: taking vyvanse 20mg daily. Is still walking most days.  Does not know his current weight.Sleep is going well. School is going well. Is living at home currently because of Covid.    - will increase to vyvanse 30mg daily  - 2,000mg metformin     May 2021: He is not sure what his weight is. There is a scale in his house, but in someone else's room. Discussed getting a scale he feels comfortable using in his room. Will refill vyvanse and metformin. He is working on sleep hygiene and exercise  - follow-up 2 to 3 months  - labs     Dec 2021: Will continue vyvanse 30mg. Will also start semaglutide 0.25mg per week for at least 1 month. Then can increase to 0.5mg per week  - emphasized need to self-weigh    Aug 2022: Weight is up, weighed 330 today on parents' scale. He reflects on the fact that when he was weighing himself last year, it was on carpet and the weight varied by about 20 pounds up or down. Tried semaglutide last year, but would go too long without eating, and then eat a lot at once because starving all of a sudden. He notices that the vyvanse makes him not hungry at all for 7 to 8 hours.   Metformin: we will stop this (was not taking)  Semaglutide: we will stop this (was not taking)  Vyvanse: will continue this, 40mg  Bupropion: will start contrave, titrate dose slowly. He prefers contrave over the two components individually.   See pt instructions for additional details discussed.     Jan 2023  He is back in school, now is living on campus. He gets in bed around 11pm- 12am, but doesn't fall asleep right away because of screens. We discussed sleep hygiene.   Lisdexamfetamine: Increased 30mg to 40mg, which helped his ADHD. As far as his eating, he eats breakfast immediately right after. Then eats again at 3:00pm to 4:00pm.  - does snack; has [employer] membership   The GLP-1 RA worked so well that he felt like his appetite was too low that he didn't eat regularly through the day, then got so hungry that he ate a lot and had emesis.   - follow-up with nutrition about regular intake  - follow-up with psychiatry about ADHD and executive function, per patient desire for working on this. Also for  how that relates to over-eating and binge eating  - could consider topiramate as well    Dec 2023: Is here in person, which is great for aligning his home scale with the clinic scale values. Is living at home now between semesters. Uses [employer] for food. Discussed not buying large amounts of prepared/packaged foods. He points out that his family members have responded well to GLP-1 receptor agonism. We will prescribe semaglutide or tirzepatide, will start whichever is covered by insurance and available.     May 2024. Seeing Mr Hancock in person. His weight is down since December because of tirzepatide. However, now tirzepatide is in short supply. We will order semaglutide. He is doing well with activity and sleep. Will continue vyvanse as well. Could consider increasing vyvanse to 60mg in the future.             Sincerely,    Jacqueline Hernandez MD      No LOS data to display   Time spent by me doing chart review, history and exam, documentation and further activities per the note

## 2024-05-28 ENCOUNTER — TELEPHONE (OUTPATIENT)
Dept: PEDIATRICS | Facility: CLINIC | Age: 24
End: 2024-05-28
Payer: COMMERCIAL

## 2024-05-28 NOTE — TELEPHONE ENCOUNTER
[facility]  Fax:[phone]  Spec: [phone]  MO: [phone]

## 2024-06-06 NOTE — TELEPHONE ENCOUNTER
PA Initiation    Medication: WEGOVY 0.25 MG/0.5ML SC SOAJ  Insurance Company: [employer] - Phone [phone] Fax [phone]  Pharmacy Filling the Rx: [facility] - [place], [place] - [address]   Filling Pharmacy Phone: [phone]  Filling Pharmacy Fax: [phone]  Start Date: 6/6/2024         Thank you,     Amando Vasquez [facility]  Pharmacy Clinic [facility]  [email or web address]   Phone: [phone]  Fax: [phone]

## 2024-06-07 NOTE — TELEPHONE ENCOUNTER
Prior Authorization Approval    Medication: WEGOVY 0.25 MG/0.5ML SC SOAJ  Authorization Effective Date: 5/7/2024  Authorization Expiration Date: 6/6/2025  Approved Dose/Quantity: 2 ml per 28 days  Reference #: [id number]   Insurance Company: [employer] - Phone [phone] Fax [phone]  Expected CoPay: $  0  CoPay Card Available:      Financial Assistance Needed: No  Which Pharmacy is filling the prescription[facility] [address] [facility]  Pharmacy Notified: Yes  Patient Notified: Yes **pharmacy will contact pt when ready to ship**      Thank you,     Amando Vasquez CP  Pharmacy Clinic [facility] [place] [email or web address]   Phone: [phone]  Fax: [phone]

## 2024-06-24 RX ORDER — TIRZEPATIDE 2.5 MG/.5ML
INJECTION, SOLUTION SUBCUTANEOUS
Qty: 2 ML | Refills: 0 | OUTPATIENT
Start: 2024-06-24

## 2024-07-14 ENCOUNTER — HEALTH MAINTENANCE LETTER (OUTPATIENT)
Age: 24
End: 2024-07-14

## 2024-07-22 ENCOUNTER — MYC REFILL (OUTPATIENT)
Dept: ENDOCRINOLOGY | Facility: CLINIC | Age: 24
End: 2024-07-22
Payer: COMMERCIAL

## 2024-07-22 DIAGNOSIS — E66.01 OBESITY, CLASS III, BMI 40-49.9 (MORBID OBESITY) (H): ICD-10-CM

## 2024-07-22 DIAGNOSIS — F90.9 ATTENTION DEFICIT HYPERACTIVITY DISORDER (ADHD), UNSPECIFIED ADHD TYPE: ICD-10-CM

## 2024-07-24 DIAGNOSIS — F90.9 ATTENTION DEFICIT HYPERACTIVITY DISORDER (ADHD), UNSPECIFIED ADHD TYPE: Primary | ICD-10-CM

## 2024-07-24 DIAGNOSIS — F90.9 ATTENTION DEFICIT HYPERACTIVITY DISORDER (ADHD), UNSPECIFIED ADHD TYPE: ICD-10-CM

## 2024-07-24 RX ORDER — LISDEXAMFETAMINE DIMESYLATE 60 MG/1
60 CAPSULE ORAL EVERY MORNING
Qty: 30 CAPSULE | Refills: 0 | Status: SHIPPED | OUTPATIENT
Start: 2024-07-24

## 2024-07-24 RX ORDER — LISDEXAMFETAMINE DIMESYLATE 50 MG/1
50 CAPSULE ORAL EVERY MORNING
Qty: 30 CAPSULE | Refills: 0 | Status: SHIPPED | OUTPATIENT
Start: 2024-07-24

## 2024-07-24 RX ORDER — LISDEXAMFETAMINE DIMESYLATE 60 MG/1
60 CAPSULE ORAL EVERY MORNING
Qty: 90 CAPSULE | Refills: 0 | Status: SHIPPED | OUTPATIENT
Start: 2024-07-24

## 2024-07-24 RX ORDER — LISDEXAMFETAMINE DIMESYLATE 50 MG/1
50 CAPSULE ORAL EVERY MORNING
Qty: 90 CAPSULE | Refills: 0 | Status: SHIPPED | OUTPATIENT
Start: 2024-07-24

## 2024-10-25 ENCOUNTER — MYC REFILL (OUTPATIENT)
Dept: ENDOCRINOLOGY | Facility: CLINIC | Age: 24
End: 2024-10-25
Payer: COMMERCIAL

## 2024-10-25 DIAGNOSIS — F90.9 ATTENTION DEFICIT HYPERACTIVITY DISORDER (ADHD), UNSPECIFIED ADHD TYPE: ICD-10-CM

## 2024-10-25 DIAGNOSIS — E66.01 OBESITY, CLASS III, BMI 40-49.9 (MORBID OBESITY) (H): ICD-10-CM

## 2024-11-07 ENCOUNTER — MYC REFILL (OUTPATIENT)
Dept: ENDOCRINOLOGY | Facility: CLINIC | Age: 24
End: 2024-11-07
Payer: COMMERCIAL

## 2024-11-07 DIAGNOSIS — F90.9 ATTENTION DEFICIT HYPERACTIVITY DISORDER (ADHD), UNSPECIFIED ADHD TYPE: ICD-10-CM

## 2024-11-07 DIAGNOSIS — E66.01 OBESITY, CLASS III, BMI 40-49.9 (MORBID OBESITY) (H): ICD-10-CM

## 2024-11-07 RX ORDER — LISDEXAMFETAMINE DIMESYLATE 60 MG/1
60 CAPSULE ORAL EVERY MORNING
Qty: 90 CAPSULE | Refills: 0 | Status: SHIPPED | OUTPATIENT
Start: 2024-11-07

## 2024-11-15 RX ORDER — LISDEXAMFETAMINE DIMESYLATE 60 MG/1
60 CAPSULE ORAL EVERY MORNING
Qty: 90 CAPSULE | Refills: 0 | Status: SHIPPED | OUTPATIENT
Start: 2024-11-15

## 2024-11-26 ENCOUNTER — TELEPHONE (OUTPATIENT)
Dept: PEDIATRICS | Facility: CLINIC | Age: 24
End: 2024-11-26
Payer: COMMERCIAL

## 2024-11-26 NOTE — TELEPHONE ENCOUNTER
[facility]  Fax:[phone]  Spec: [phone]  MO: [phone]

## 2024-12-04 NOTE — TELEPHONE ENCOUNTER
PA Initiation    Medication: WEGOVY 1 MG/0.5ML SC SOAJ  Insurance Company: Other (see comments)Comment:  [facility]  Pharmacy Filling the Rx: [facility] - [place], [place] - [facility] [address]  Filling Pharmacy Phone: [phone]  Filling Pharmacy Fax: [phone]  Start Date: 12/3/2024    Key: [id number]

## 2024-12-09 ENCOUNTER — TELEPHONE (OUTPATIENT)
Dept: INTERNAL MEDICINE | Facility: CLINIC | Age: 24
End: 2024-12-09
Payer: COMMERCIAL

## 2024-12-09 NOTE — TELEPHONE ENCOUNTER
[facility]  Fax:[phone]  Spec: [phone]  MO: [phone]

## 2024-12-11 NOTE — TELEPHONE ENCOUNTER
PA Initiation    Medication: WEGOVY 0.25 MG/0.5ML SC SOAJ  Insurance Company: Other (see comments)  Pharmacy Filling the Rx: [facility] - [place], [place] - [facility] [address]  Filling Pharmacy Phone:    Filling Pharmacy Fax:    Start Date: 12/11/2024       Ronny Hancock (Saini: [id number])

## 2024-12-12 ENCOUNTER — TELEPHONE (OUTPATIENT)
Dept: ENDOCRINOLOGY | Facility: CLINIC | Age: 24
End: 2024-12-12
Payer: COMMERCIAL

## 2024-12-12 NOTE — TELEPHONE ENCOUNTER
PRIOR AUTHORIZATION DENIED    Medication: WEGOVY 0.25 MG/0.5ML SC SOAJ  Insurance Company: Other (see comments)  Denial Date: 12/12/2024  Denial Reason(s): Chart notes sent were older than 90 days.  Plan requires Visit and updated BMI within the last 90 days.    Appeal Information: Information can be submitted by starting a new PA in [facility] or by calling the plan at [phone] or faxing chart notes to [phone].

## 2024-12-12 NOTE — TELEPHONE ENCOUNTER
[employer] insurance company under [employer] insurance is calling regarding a prior authorization for Wegovy 0.25 mg. The prior authorization was denied due to medical necessity. Pt. Would have to have a BMI of 30kg per meter squared or more. Pt. Can file an appeal within 180 days of the letter. This information will be included to Pt. By mail and to the office and will include a bill of rights. Questions, please contact Channing GAVIN at [phone] option 2.   x2 attempt. Unable to leave message.     Patient portal message sent.     Danyelle POWELL RN  For Dr. Pool

## 2024-12-14 NOTE — TELEPHONE ENCOUNTER
Can try to resubmit after Thursday or else fax chart notes to [phone].    Thank You!    Sam Darden CP Pharmacy Liaison  [facility] [email or web address] [email or web address]  Phone: [phone]  Fax: [phone]

## 2024-12-19 ENCOUNTER — VIRTUAL VISIT (OUTPATIENT)
Dept: ENDOCRINOLOGY | Facility: CLINIC | Age: 24
End: 2024-12-19
Payer: COMMERCIAL

## 2024-12-19 VITALS — BODY MASS INDEX: 30.84 KG/M2 | HEIGHT: 75 IN | WEIGHT: 248 LBS

## 2024-12-19 DIAGNOSIS — E66.01 OBESITY, CLASS III, BMI 40-49.9 (MORBID OBESITY) (H): Primary | ICD-10-CM

## 2024-12-19 DIAGNOSIS — F90.9 ATTENTION DEFICIT HYPERACTIVITY DISORDER (ADHD), UNSPECIFIED ADHD TYPE: ICD-10-CM

## 2024-12-19 DIAGNOSIS — E55.9 HYPOVITAMINOSIS D: ICD-10-CM

## 2024-12-19 RX ORDER — LISDEXAMFETAMINE DIMESYLATE 60 MG/1
60 CAPSULE ORAL EVERY MORNING
Qty: 90 CAPSULE | Refills: 0 | Status: SHIPPED | OUTPATIENT
Start: 2025-02-06

## 2024-12-19 ASSESSMENT — PAIN SCALES - GENERAL: PAINLEVEL_OUTOF10: NO PAIN (0)

## 2024-12-19 ASSESSMENT — PATIENT HEALTH QUESTIONNAIRE - PHQ9
SUM OF ALL RESPONSES TO PHQ QUESTIONS 1-9: 10
10. IF YOU CHECKED OFF ANY PROBLEMS, HOW DIFFICULT HAVE THESE PROBLEMS MADE IT FOR YOU TO DO YOUR WORK, TAKE CARE OF THINGS AT HOME, OR GET ALONG WITH OTHER PEOPLE: NOT DIFFICULT AT ALL
SUM OF ALL RESPONSES TO PHQ QUESTIONS 1-9: 10
SUM OF ALL RESPONSES TO PHQ QUESTIONS 1-9: 10

## 2024-12-19 NOTE — LETTER
2024       RE: Ronny Hancock  [address] [facility] [address]     Dear Colleague,    Thank you for referring your patient, Ronny Hancock, to the [facility] [place] [facility]. Please see a copy of my visit note below.      Return Medical Weight Management Note     Ronny Hancock  MRN:  [id number]  :  2000  AMBER:  2024    Dear Juanjose Norris MD (Inactive),    I had the pleasure of seeing your patient Ronny Hancock. He is a 24 year old male who I am continuing to see for treatment of obesity related to:        2020    10:49 AM   --   I have the following health issues associated with obesity None of the above   I have the following symptoms associated with obesity None of the above       CURRENT WEIGHT:   248 lbs 0 oz  Wt Readings from Last 4 Encounters:   24 112.5 kg (248 lb)   24 115.7 kg (255 lb)   24 149.5 kg (329 lb 9.6 oz)   23 (!) 156.2 kg (344 lb 4.8 oz)      MEDICATIONS:   Current Outpatient Medications   Medication Sig Dispense Refill     [START ON 2025] lisdexamfetamine (VYVANSE) 60 MG capsule Take 1 capsule (60 mg) by mouth every morning. 90 capsule 0     Semaglutide-Weight Management (WEGOVY) 0.5 MG/0.5ML pen Inject 0.5 mg subcutaneously once a week. 3 mL 3     fluconazole (DIFLUCAN) 150 MG tablet 1 tab(s) (Patient not taking: Reported on 2023)       ketoconazole (NIZORAL) 2 % external cream 1 roman (Patient not taking: Reported on 2023)       lisdexamfetamine (VYVANSE) 50 MG capsule Take 1 capsule (50 mg) by mouth every morning 30 capsule 0     lisdexamfetamine (VYVANSE) 50 MG capsule Take 1 capsule (50 mg) by mouth every morning 90 capsule 0     lisdexamfetamine (VYVANSE) 50 MG capsule Take 1 capsule (50 mg) by mouth every morning 90 capsule 0     lisdexamfetamine (VYVANSE) 50 MG capsule Take 1 capsule (50 mg) by mouth every morning 90 capsule 0      lisdexamfetamine (VYVANSE) 50 MG capsule Take 1 capsule (50 mg) by mouth every morning 90 capsule 0     lisdexamfetamine (VYVANSE) 50 MG capsule Take 1 capsule (50 mg) by mouth every morning 30 capsule 0     lisdexamfetamine (VYVANSE) 60 MG capsule Take 1 capsule (60 mg) by mouth every morning. 90 capsule 0     lisdexamfetamine (VYVANSE) 60 MG capsule Take 1 capsule (60 mg) by mouth every morning 30 capsule 0     semaglutide-weight management (WEGOVY) 0.25 MG/0.5ML pen Inject 0.5 mLs (0.25 mg) subcutaneously once a week. 2 mL 0     Semaglutide-Weight Management (WEGOVY) 0.25 MG/0.5ML pen Inject 0.25 mg Subcutaneous once a week 2 mL 0     Semaglutide-Weight Management (WEGOVY) 0.5 MG/0.5ML pen Inject 0.5 mg Subcutaneous once a week 3 mL 0     Semaglutide-Weight Management (WEGOVY) 1 MG/0.5ML pen Inject 1 mg subcutaneously once a week. 2 mL 0     Semaglutide-Weight Management (WEGOVY) 1 MG/0.5ML pen Inject 1 mg Subcutaneous once a week 2 mL 3     Semaglutide-Weight Management (WEGOVY) 1.7 MG/0.75ML pen Inject 1.7 mg subcutaneously once a week. 3 mL 3     terbinafine (LAMISIL) 1 % external cream 1 roman (Patient not taking: Reported on 12/21/2023)       tirzepatide-Weight Management (ZEPBOUND) 2.5 MG/0.5ML prefilled pen Inject 0.5 mLs (2.5 mg) Subcutaneous every 7 days 2 mL 0     tirzepatide-Weight Management (ZEPBOUND) 5 MG/0.5ML prefilled pen Inject 0.5 mLs (5 mg) Subcutaneous every 7 days 2 mL 0     tirzepatide-Weight Management (ZEPBOUND) 7.5 MG/0.5ML prefilled pen Inject 0.5 mLs (7.5 mg) Subcutaneous every 7 days 4 mL 1     tirzepatide-Weight Management (ZEPBOUND) 7.5 MG/0.5ML prefilled pen Inject 0.5 mLs (7.5 mg) Subcutaneous every 7 days 2 mL 3           12/19/2024     3:54 PM   Weight Loss Medication History Reviewed With Patient   Which weight loss medications are you currently taking on a regular basis? Ozempic    Vyvanse    Wegovy   If you are not taking a weight loss medication that was prescribed to you,  "please indicate why: Other   Are you having any side effects from the weight loss medication that we have prescribed you? No     PHYSICAL EXAM:  Objective   Ht 1.905 m (6' 3\")   Wt 112.5 kg (248 lb)   BMI 31.00 kg/m      GENERAL: alert and no distress  EYES: Eyes grossly normal to inspection.  No discharge or erythema, or obvious scleral/conjunctival abnormalities.  RESP: No audible wheeze, cough, or visible cyanosis.    SKIN: Visible skin clear. No significant rash, abnormal pigmentation or lesions.  NEURO: Cranial nerves grossly intact.  Mentation and speech appropriate for age.  PSYCH: Appropriate affect, tone, and pace of words    ASSESSMENT/PLAN:  Problem   Obesity, Class III, Bmi 40-49.9 (Morbid Obesity) (H)    Oct 2020: Restarting vyvanse, as this had helped with decreased binge eating as well as ADHD. Starting metformin for help with weight, drawing labs. Is walking regularly (2-3 days/week).   - starting metformin  - restarting vyvanse   - continuing walking, starting weighing    Jan2021: taking vyvanse 20mg daily. Is still walking most days.  Does not know his current weight.Sleep is going well. School is going well. Is living at home currently because of Covid.   - will increase to vyvanse 30mg daily  - 2,000mg metformin     May 2021: He is not sure what his weight is. There is a scale in his house, but in someone else's room. Discussed getting a scale he feels comfortable using in his room. Will refill vyvanse and metformin. He is working on sleep hygiene and exercise  - follow-up 2 to 3 months  - labs     Dec 2021: Will continue vyvanse 30mg. Will also start semaglutide 0.25mg per week for at least 1 month. Then can increase to 0.5mg per week  - emphasized need to self-weigh    Aug 2022: Weight is up, weighed 330 today on parents' scale. He reflects on the fact that when he was weighing himself last year, it was on carpet and the weight varied by about 20 pounds up or down. Tried semaglutide last " year, but would go too long without eating, and then eat a lot at once because starving all of a sudden. He notices that the vyvanse makes him not hungry at all for 7 to 8 hours.   Metformin: we will stop this (was not taking)  Semaglutide: we will stop this (was not taking)  Vyvanse: will continue this, 40mg  Bupropion: will start contrave, titrate dose slowly. He prefers contrave over the two components individually.   See pt instructions for additional details discussed.     Jan 2023  He is back in school, now is living on campus. He gets in bed around 11pm- 12am, but doesn't fall asleep right away because of screens. We discussed sleep hygiene.   Lisdexamfetamine: Increased 30mg to 40mg, which helped his ADHD. As far as his eating, he eats breakfast immediately right after. Then eats again at 3:00pm to 4:00pm.  - does snack; has [employer] membership   The GLP-1 RA worked so well that he felt like his appetite was too low that he didn't eat regularly through the day, then got so hungry that he ate a lot and had emesis.   - follow-up with nutrition about regular intake  - follow-up with psychiatry about ADHD and executive function, per patient desire for working on this. Also for how that relates to over-eating and binge eating  - could consider topiramate as well    Dec 2023: Is here in person, which is great for aligning his home scale with the clinic scale values. Is living at home now between semesters. Uses [employer] for food. Discussed not buying large amounts of prepared/packaged foods. He points out that his family members have responded well to GLP-1 receptor agonism. We will prescribe semaglutide or tirzepatide, will start whichever is covered by insurance and available.     May 2024. Seeing Mr Hancock in person. His weight is down since December because of tirzepatide. However, now tirzepatide is in short supply. We will order semaglutide. He is doing well with activity and sleep. Will continue vyvanse as well.  Could consider increasing vyvanse to 60mg in the future.     Dec 2024: He has lost about 65 pounds since starting semaglutide - 0.25mg, 0.5mg, and then 1.0mg. Also felt better all around, not waking up as tired. Easier to fall asleep with it.              Sincerely,    Jacqueline Hernandez MD      No LOS data to display   Time spent by me today doing chart review, history and exam, documentation and further activities per the note      Virtual Visit Details    Type of service:  Video Visit     Originating Location (pt. Location): Home  {PROVIDER LOCATION On-site should be selected for visits conducted from your clinic location or adjoining [facility] hospital, academic office, or other nearby [facility] building. Off-site should be selected for all other provider locations, including home:[id number]}  Distant Location (provider location):  On-site  Platform used for Video Visit: Lisa      Again, thank you for allowing me to participate in the care of your patient.      Sincerely,    Jacqueline Hernandez MD

## 2024-12-19 NOTE — PROGRESS NOTES
Return Medical Weight Management Note     Ronny Hancock  MRN:  [id number]  :  2000  AMBER:  2024    Dear Juanjose Norris MD (Inactive),    I had the pleasure of seeing your patient Ronny Hancock. He is a 24 year old male who I am continuing to see for treatment of obesity related to:        2020    10:49 AM   --   I have the following health issues associated with obesity None of the above   I have the following symptoms associated with obesity None of the above       CURRENT WEIGHT:   248 lbs 0 oz  Wt Readings from Last 4 Encounters:   24 112.5 kg (248 lb)   24 115.7 kg (255 lb)   24 149.5 kg (329 lb 9.6 oz)   23 (!) 156.2 kg (344 lb 4.8 oz)      MEDICATIONS:   Current Outpatient Medications   Medication Sig Dispense Refill    [START ON 2025] lisdexamfetamine (VYVANSE) 60 MG capsule Take 1 capsule (60 mg) by mouth every morning. 90 capsule 0    Semaglutide-Weight Management (WEGOVY) 0.5 MG/0.5ML pen Inject 0.5 mg subcutaneously once a week. 3 mL 3    fluconazole (DIFLUCAN) 150 MG tablet 1 tab(s) (Patient not taking: Reported on 2023)      ketoconazole (NIZORAL) 2 % external cream 1 roman (Patient not taking: Reported on 2023)      lisdexamfetamine (VYVANSE) 50 MG capsule Take 1 capsule (50 mg) by mouth every morning 30 capsule 0    lisdexamfetamine (VYVANSE) 50 MG capsule Take 1 capsule (50 mg) by mouth every morning 90 capsule 0    lisdexamfetamine (VYVANSE) 50 MG capsule Take 1 capsule (50 mg) by mouth every morning 90 capsule 0    lisdexamfetamine (VYVANSE) 50 MG capsule Take 1 capsule (50 mg) by mouth every morning 90 capsule 0    lisdexamfetamine (VYVANSE) 50 MG capsule Take 1 capsule (50 mg) by mouth every morning 90 capsule 0    lisdexamfetamine (VYVANSE) 50 MG capsule Take 1 capsule (50 mg) by mouth every morning 30 capsule 0    lisdexamfetamine (VYVANSE) 60 MG capsule Take 1 capsule (60 mg) by mouth every morning. 90 capsule 0     "lisdexamfetamine (VYVANSE) 60 MG capsule Take 1 capsule (60 mg) by mouth every morning 30 capsule 0    semaglutide-weight management (WEGOVY) 0.25 MG/0.5ML pen Inject 0.5 mLs (0.25 mg) subcutaneously once a week. 2 mL 0    Semaglutide-Weight Management (WEGOVY) 0.25 MG/0.5ML pen Inject 0.25 mg Subcutaneous once a week 2 mL 0    Semaglutide-Weight Management (WEGOVY) 0.5 MG/0.5ML pen Inject 0.5 mg Subcutaneous once a week 3 mL 0    Semaglutide-Weight Management (WEGOVY) 1 MG/0.5ML pen Inject 1 mg subcutaneously once a week. 2 mL 0    Semaglutide-Weight Management (WEGOVY) 1 MG/0.5ML pen Inject 1 mg Subcutaneous once a week 2 mL 3    Semaglutide-Weight Management (WEGOVY) 1.7 MG/0.75ML pen Inject 1.7 mg subcutaneously once a week. 3 mL 3    terbinafine (LAMISIL) 1 % external cream 1 roman (Patient not taking: Reported on 12/21/2023)      tirzepatide-Weight Management (ZEPBOUND) 2.5 MG/0.5ML prefilled pen Inject 0.5 mLs (2.5 mg) Subcutaneous every 7 days 2 mL 0    tirzepatide-Weight Management (ZEPBOUND) 5 MG/0.5ML prefilled pen Inject 0.5 mLs (5 mg) Subcutaneous every 7 days 2 mL 0    tirzepatide-Weight Management (ZEPBOUND) 7.5 MG/0.5ML prefilled pen Inject 0.5 mLs (7.5 mg) Subcutaneous every 7 days 4 mL 1    tirzepatide-Weight Management (ZEPBOUND) 7.5 MG/0.5ML prefilled pen Inject 0.5 mLs (7.5 mg) Subcutaneous every 7 days 2 mL 3           12/19/2024     3:54 PM   Weight Loss Medication History Reviewed With Patient   Which weight loss medications are you currently taking on a regular basis? Ozempic    Vyvanse    Wegovy   If you are not taking a weight loss medication that was prescribed to you, please indicate why: Other   Are you having any side effects from the weight loss medication that we have prescribed you? No     PHYSICAL EXAM:  Objective    Ht 1.905 m (6' 3\")   Wt 112.5 kg (248 lb)   BMI 31.00 kg/m      GENERAL: alert and no distress  EYES: Eyes grossly normal to inspection.  No discharge or erythema, or " obvious scleral/conjunctival abnormalities.  RESP: No audible wheeze, cough, or visible cyanosis.    SKIN: Visible skin clear. No significant rash, abnormal pigmentation or lesions.  NEURO: Cranial nerves grossly intact.  Mentation and speech appropriate for age.  PSYCH: Appropriate affect, tone, and pace of words    ASSESSMENT/PLAN:  Problem   Obesity, Class III, Bmi 40-49.9 (Morbid Obesity) (H)    Oct 2020: Restarting vyvanse, as this had helped with decreased binge eating as well as ADHD. Starting metformin for help with weight, drawing labs. Is walking regularly (2-3 days/week).   - starting metformin  - restarting vyvanse   - continuing walking, starting weighing    Jan2021: taking vyvanse 20mg daily. Is still walking most days.  Does not know his current weight.Sleep is going well. School is going well. Is living at home currently because of Covid.   - will increase to vyvanse 30mg daily  - 2,000mg metformin     May 2021: He is not sure what his weight is. There is a scale in his house, but in someone else's room. Discussed getting a scale he feels comfortable using in his room. Will refill vyvanse and metformin. He is working on sleep hygiene and exercise  - follow-up 2 to 3 months  - labs     Dec 2021: Will continue vyvanse 30mg. Will also start semaglutide 0.25mg per week for at least 1 month. Then can increase to 0.5mg per week  - emphasized need to self-weigh    Aug 2022: Weight is up, weighed 330 today on parents' scale. He reflects on the fact that when he was weighing himself last year, it was on carpet and the weight varied by about 20 pounds up or down. Tried semaglutide last year, but would go too long without eating, and then eat a lot at once because starving all of a sudden. He notices that the vyvanse makes him not hungry at all for 7 to 8 hours.   Metformin: we will stop this (was not taking)  Semaglutide: we will stop this (was not taking)  Vyvanse: will continue this, 40mg  Bupropion: will  start contrave, titrate dose slowly. He prefers contrave over the two components individually.   See pt instructions for additional details discussed.     Jan 2023  He is back in school, now is living on campus. He gets in bed around 11pm- 12am, but doesn't fall asleep right away because of screens. We discussed sleep hygiene.   Lisdexamfetamine: Increased 30mg to 40mg, which helped his ADHD. As far as his eating, he eats breakfast immediately right after. Then eats again at 3:00pm to 4:00pm.  - does snack; has [employer] membership   The GLP-1 RA worked so well that he felt like his appetite was too low that he didn't eat regularly through the day, then got so hungry that he ate a lot and had emesis.   - follow-up with nutrition about regular intake  - follow-up with psychiatry about ADHD and executive function, per patient desire for working on this. Also for how that relates to over-eating and binge eating  - could consider topiramate as well    Dec 2023: Is here in person, which is great for aligning his home scale with the clinic scale values. Is living at home now between semesters. Uses [employer] for food. Discussed not buying large amounts of prepared/packaged foods. He points out that his family members have responded well to GLP-1 receptor agonism. We will prescribe semaglutide or tirzepatide, will start whichever is covered by insurance and available.     May 2024. Seeing Mr Hancock in person. His weight is down since December because of tirzepatide. However, now tirzepatide is in short supply. We will order semaglutide. He is doing well with activity and sleep. Will continue vyvanse as well. Could consider increasing vyvanse to 60mg in the future.     Dec 2024: He has lost about 65 pounds since starting semaglutide - 0.25mg, 0.5mg, and then 1.0mg. Also felt better all around, not waking up as tired. Easier to fall asleep with it.              Sincerely,    Jacqueline Hernandez MD      No LOS data to display    Time spent by me today doing chart review, history and exam, documentation and further activities per the note      Virtual Visit Details    Type of service:  Video Visit     Originating Location (pt. Location): Home    Distant Location (provider location):  On-site  Platform used for Video Visit: Lisa

## 2024-12-19 NOTE — NURSING NOTE
Is the patient currently in the state of [place]? YES    Visit mode:VIDEO    If the visit is dropped, the patient can be reconnected by: VIDEO VISIT: Send to e-mail at: [email or web address]    Will anyone else be joining the visit? NO  (If patient encounters technical issues they should call [phone]    How would you like to obtain your AVS? MyChart    Are changes needed to the allergy or medication list? No    Are refills needed on medications prescribed by this physician? NO    Reason for visit: RECHHENOK ARECHIGA

## 2024-12-19 NOTE — TELEPHONE ENCOUNTER
Submitted chart notes from 12/19/2024 to [phone].    Thank You!    Sam Darden [facility] Pharmacy Liaison  [facility] [email or web address]  Phone: [phone]  Fax: [phone]

## 2025-03-25 ENCOUNTER — TELEPHONE (OUTPATIENT)
Dept: PEDIATRICS | Facility: CLINIC | Age: 25
End: 2025-03-25
Payer: COMMERCIAL

## 2025-03-25 NOTE — TELEPHONE ENCOUNTER
You have an appointment currently scheduled with Jacqueline Hernandez on 5/15.  Due to changes to the providers schedule we need to reschedule your appointment.      Please use your MyChart or call [phone] to reschedule this appointment at your earliest convenience.    We apologize for any inconvenience this may cause.    We look forward to seeing you at your upcoming appointment and thank you for choosing [facility].    Thank you for trusting us with your care.    Sincerely, [facility]

## 2025-06-03 ENCOUNTER — TELEPHONE (OUTPATIENT)
Dept: PEDIATRICS | Facility: CLINIC | Age: 25
End: 2025-06-03
Payer: COMMERCIAL

## 2025-06-03 NOTE — TELEPHONE ENCOUNTER
PA RENEWAL Initiation    Medication: WEGOVY 1.7 MG/0.75ML SC SOAJ  Insurance Company: [employer] - Phone [phone] Fax [phone]  Pharmacy Filling the Rx:    Filling Pharmacy Phone:    Filling Pharmacy Fax:    Start Date: 6/3/2025    [id number]

## 2025-06-04 NOTE — TELEPHONE ENCOUNTER
Prior Authorization RENEWAL Approval    Medication: WEGOVY 1.7 MG/0.75ML SC SOAJ  Authorization Effective Date: 5/5/2025  Authorization Expiration Date: 6/4/2026  Approved Dose/Quantity: 3ml per 28 days  Reference #: [id number]   Insurance Company: [employer] - Phone [phone] Fax [phone]  Expected CoPay: $    CoPay Card Available:      Financial Assistance Needed:   Which Pharmacy is filling the prescription:    Pharmacy Notified:   Patient Notified:

## 2025-07-19 ENCOUNTER — HEALTH MAINTENANCE LETTER (OUTPATIENT)
Age: 25
End: 2025-07-19

## 2025-08-07 ENCOUNTER — VIRTUAL VISIT (OUTPATIENT)
Dept: ENDOCRINOLOGY | Facility: CLINIC | Age: 25
End: 2025-08-07
Payer: COMMERCIAL

## 2025-08-07 VITALS — HEIGHT: 76 IN | BODY MASS INDEX: 27.64 KG/M2 | WEIGHT: 227 LBS

## 2025-08-07 DIAGNOSIS — F90.9 ATTENTION DEFICIT HYPERACTIVITY DISORDER (ADHD), UNSPECIFIED ADHD TYPE: ICD-10-CM

## 2025-08-07 DIAGNOSIS — E55.9 HYPOVITAMINOSIS D: ICD-10-CM

## 2025-08-07 DIAGNOSIS — E66.813 OBESITY, CLASS III, BMI 40-49.9 (MORBID OBESITY) (H): Primary | ICD-10-CM

## 2025-08-07 RX ORDER — LISDEXAMFETAMINE DIMESYLATE 60 MG/1
60 CAPSULE ORAL EVERY MORNING
Qty: 90 CAPSULE | Refills: 0 | Status: SHIPPED | OUTPATIENT
Start: 2025-08-07